# Patient Record
Sex: MALE | Race: WHITE | NOT HISPANIC OR LATINO | Employment: UNEMPLOYED | URBAN - METROPOLITAN AREA
[De-identification: names, ages, dates, MRNs, and addresses within clinical notes are randomized per-mention and may not be internally consistent; named-entity substitution may affect disease eponyms.]

---

## 2017-06-21 ENCOUNTER — HOSPITAL ENCOUNTER (EMERGENCY)
Facility: HOSPITAL | Age: 57
Discharge: HOME | End: 2017-06-21
Attending: EMERGENCY MEDICINE | Admitting: EMERGENCY MEDICINE

## 2017-06-21 VITALS
WEIGHT: 315 LBS | HEIGHT: 72 IN | HEART RATE: 85 BPM | BODY MASS INDEX: 42.66 KG/M2 | TEMPERATURE: 98.3 F | SYSTOLIC BLOOD PRESSURE: 114 MMHG | DIASTOLIC BLOOD PRESSURE: 77 MMHG | RESPIRATION RATE: 20 BRPM | OXYGEN SATURATION: 96 %

## 2017-06-21 DIAGNOSIS — M54.42 ACUTE BILATERAL LOW BACK PAIN WITH LEFT-SIDED SCIATICA: Primary | ICD-10-CM

## 2017-06-21 PROCEDURE — 25010000002 KETOROLAC TROMETHAMINE PER 15 MG: Performed by: EMERGENCY MEDICINE

## 2017-06-21 PROCEDURE — 25010000002 METHYLPREDNISOLONE PER 125 MG: Performed by: EMERGENCY MEDICINE

## 2017-06-21 PROCEDURE — 96372 THER/PROPH/DIAG INJ SC/IM: CPT

## 2017-06-21 PROCEDURE — 99282 EMERGENCY DEPT VISIT SF MDM: CPT | Performed by: EMERGENCY MEDICINE

## 2017-06-21 PROCEDURE — 99283 EMERGENCY DEPT VISIT LOW MDM: CPT

## 2017-06-21 RX ORDER — NABUMETONE 750 MG/1
750 TABLET, FILM COATED ORAL 2 TIMES DAILY PRN
Qty: 14 TABLET | Refills: 0 | Status: SHIPPED | OUTPATIENT
Start: 2017-06-21 | End: 2017-06-28

## 2017-06-21 RX ORDER — METHYLPREDNISOLONE SODIUM SUCCINATE 125 MG/2ML
80 INJECTION, POWDER, LYOPHILIZED, FOR SOLUTION INTRAMUSCULAR; INTRAVENOUS ONCE
Status: COMPLETED | OUTPATIENT
Start: 2017-06-21 | End: 2017-06-21

## 2017-06-21 RX ORDER — KETOROLAC TROMETHAMINE 30 MG/ML
INJECTION, SOLUTION INTRAMUSCULAR; INTRAVENOUS
Status: DISCONTINUED
Start: 2017-06-21 | End: 2017-06-21 | Stop reason: HOSPADM

## 2017-06-21 RX ORDER — HYDROCODONE BITARTRATE AND ACETAMINOPHEN 7.5; 325 MG/1; MG/1
1 TABLET ORAL EVERY 6 HOURS PRN
Qty: 20 TABLET | Refills: 0 | Status: SHIPPED | OUTPATIENT
Start: 2017-06-21 | End: 2017-07-20

## 2017-06-21 RX ORDER — KETOROLAC TROMETHAMINE 30 MG/ML
60 INJECTION, SOLUTION INTRAMUSCULAR; INTRAVENOUS ONCE
Status: DISCONTINUED | OUTPATIENT
Start: 2017-06-21 | End: 2017-06-21 | Stop reason: HOSPADM

## 2017-06-21 RX ORDER — PREDNISONE 10 MG/1
TABLET ORAL
Qty: 21 TABLET | Refills: 0 | Status: SHIPPED | OUTPATIENT
Start: 2017-06-21 | End: 2017-07-20

## 2017-06-21 RX ORDER — KETOROLAC TROMETHAMINE 15 MG/ML
60 INJECTION, SOLUTION INTRAMUSCULAR; INTRAVENOUS ONCE
Status: DISCONTINUED | OUTPATIENT
Start: 2017-06-21 | End: 2017-06-21 | Stop reason: CLARIF

## 2017-06-21 RX ORDER — CETIRIZINE HYDROCHLORIDE 10 MG/1
10 TABLET ORAL DAILY
COMMUNITY
End: 2017-07-20

## 2017-06-21 RX ORDER — IBUPROFEN 800 MG/1
800 TABLET ORAL EVERY 8 HOURS PRN
COMMUNITY
End: 2017-07-20

## 2017-06-21 RX ORDER — METHOCARBAMOL 750 MG/1
750 TABLET, FILM COATED ORAL 3 TIMES DAILY PRN
Qty: 42 TABLET | Refills: 0 | Status: SHIPPED | OUTPATIENT
Start: 2017-06-21 | End: 2017-06-28

## 2017-06-21 RX ADMIN — METHYLPREDNISOLONE SODIUM SUCCINATE 80 MG: 125 INJECTION, POWDER, FOR SOLUTION INTRAMUSCULAR; INTRAVENOUS at 09:39

## 2017-06-21 RX ADMIN — KETOROLAC TROMETHAMINE 60 MG: 30 INJECTION INTRAMUSCULAR; INTRAVENOUS at 09:37

## 2017-06-21 NOTE — DISCHARGE INSTRUCTIONS
Medications as directed.  Apply heat to low back pain relief and comfort.  Gentle stretching.  Follow-up with Dr. Denis as above.  Recommend daily exercise and healthy diet to improve overall health.  Return to ED for medical emergencies.    Hypertension  Hypertension, commonly called high blood pressure, is when the force of blood pumping through your arteries is too strong. Your arteries are the blood vessels that carry blood from your heart throughout your body. A blood pressure reading consists of a higher number over a lower number, such as 110/72. The higher number (systolic) is the pressure inside your arteries when your heart pumps. The lower number (diastolic) is the pressure inside your arteries when your heart relaxes. Ideally you want your blood pressure below 120/80.  Hypertension forces your heart to work harder to pump blood. Your arteries may become narrow or stiff. Having untreated or uncontrolled hypertension can cause heart attack, stroke, kidney disease, and other problems.  RISK FACTORS  Some risk factors for high blood pressure are controllable. Others are not.   Risk factors you cannot control include:   · Race. You may be at higher risk if you are .  · Age. Risk increases with age.  · Gender. Men are at higher risk than women before age 45 years. After age 65, women are at higher risk than men.  Risk factors you can control include:  · Not getting enough exercise or physical activity.  · Being overweight.  · Getting too much fat, sugar, calories, or salt in your diet.  · Drinking too much alcohol.  SIGNS AND SYMPTOMS  Hypertension does not usually cause signs or symptoms. Extremely high blood pressure (hypertensive crisis) may cause headache, anxiety, shortness of breath, and nosebleed.  DIAGNOSIS  To check if you have hypertension, your health care provider will measure your blood pressure while you are seated, with your arm held at the level of your heart. It should be  measured at least twice using the same arm. Certain conditions can cause a difference in blood pressure between your right and left arms. A blood pressure reading that is higher than normal on one occasion does not mean that you need treatment. If it is not clear whether you have high blood pressure, you may be asked to return on a different day to have your blood pressure checked again. Or, you may be asked to monitor your blood pressure at home for 1 or more weeks.  TREATMENT  Treating high blood pressure includes making lifestyle changes and possibly taking medicine. Living a healthy lifestyle can help lower high blood pressure. You may need to change some of your habits.  Lifestyle changes may include:  · Following the DASH diet. This diet is high in fruits, vegetables, and whole grains. It is low in salt, red meat, and added sugars.  · Keep your sodium intake below 2,300 mg per day.  · Getting at least 30-45 minutes of aerobic exercise at least 4 times per week.  · Losing weight if necessary.  · Not smoking.  · Limiting alcoholic beverages.  · Learning ways to reduce stress.  Your health care provider may prescribe medicine if lifestyle changes are not enough to get your blood pressure under control, and if one of the following is true:  · You are 18-59 years of age and your systolic blood pressure is above 140.  · You are 60 years of age or older, and your systolic blood pressure is above 150.  · Your diastolic blood pressure is above 90.  · You have diabetes, and your systolic blood pressure is over 140 or your diastolic blood pressure is over 90.  · You have kidney disease and your blood pressure is above 140/90.  · You have heart disease and your blood pressure is above 140/90.  Your personal target blood pressure may vary depending on your medical conditions, your age, and other factors.  HOME CARE INSTRUCTIONS  · Have your blood pressure rechecked as directed by your health care provider.    · Take  medicines only as directed by your health care provider. Follow the directions carefully. Blood pressure medicines must be taken as prescribed. The medicine does not work as well when you skip doses. Skipping doses also puts you at risk for problems.  · Do not smoke.    · Monitor your blood pressure at home as directed by your health care provider.   SEEK MEDICAL CARE IF:   · You think you are having a reaction to medicines taken.  · You have recurrent headaches or feel dizzy.  · You have swelling in your ankles.  · You have trouble with your vision.  SEEK IMMEDIATE MEDICAL CARE IF:  · You develop a severe headache or confusion.  · You have unusual weakness, numbness, or feel faint.  · You have severe chest or abdominal pain.  · You vomit repeatedly.  · You have trouble breathing.  MAKE SURE YOU:   · Understand these instructions.  · Will watch your condition.  · Will get help right away if you are not doing well or get worse.     This information is not intended to replace advice given to you by your health care provider. Make sure you discuss any questions you have with your health care provider.     Document Released: 12/18/2006 Document Revised: 05/03/2016 Document Reviewed: 10/10/2014  [employer] Patient Education ©2017 [employer]

## 2017-06-21 NOTE — ED PROVIDER NOTES
Subjective   Patient is a 56 y.o. male presenting with back pain.   History provided by:  Patient  Back Pain   Location:  Lumbar spine  Quality:  Burning  Radiates to:  L posterior upper leg  Pain severity:  Severe  Onset quality:  Gradual  Duration:  5 days (including today)  Timing:  Constant  Progression:  Worsening  Chronicity:  New  Context comment:  Moving furnace as described below.  Relieved by:  Nothing  Worsened by:  Bending, twisting and standing  Ineffective treatments:  Ibuprofen  Associated symptoms: numbness (posterior aspect left lower extremity) and tingling ( posterior aspectleft lower extremity)    Associated symptoms: no abdominal pain, no abdominal swelling, no bladder incontinence, no bowel incontinence, no chest pain, no dysuria, no fever, no headaches, no perianal numbness and no weakness    Risk factors: obesity      HPI Narrative: is a 55 yo WM who present secondary to low back pain that radiates to the posterior aspect of the left lower extremity.  Patient reports 4 days ago he was moving a furnace into his home.  There is no specific injury but afterwords he began having pain in his low back pain is bilateral.  The pain radiates to the posterior aspect of the left lower extremity.  He reports numbness and burning pain.  The pain is worsened with ambulation.  Patient has been taking ibuprofen at home without relief.  He went to work today but his job requires him to ambulate a significant amount of the day.  He was unable do this.  He presents for evaluation.        Review of Systems   Constitutional: Negative for chills, diaphoresis and fever.   HENT: Negative for congestion, ear pain and sore throat.    Eyes: Negative for pain and discharge.   Respiratory: Negative for chest tightness, shortness of breath, wheezing and stridor.    Cardiovascular: Negative for chest pain, palpitations and leg swelling.   Gastrointestinal: Negative for abdominal pain, bowel incontinence, diarrhea,  "nausea and vomiting.   Genitourinary: Negative for bladder incontinence, dysuria, flank pain, frequency and hematuria.   Musculoskeletal: Positive for back pain. Negative for myalgias, neck pain and neck stiffness.   Skin: Negative for color change, pallor and rash.   Neurological: Positive for tingling ( posterior aspectleft lower extremity) and numbness (posterior aspect left lower extremity). Negative for dizziness, seizures, syncope, weakness and headaches.   Psychiatric/Behavioral: Negative for agitation and confusion. The patient is not nervous/anxious.    All other systems reviewed and are negative.      Past Medical History:   Diagnosis Date   • Arthritis    • Hernia, inguinal    • Hypertension        No Known Allergies    Past Surgical History:   Procedure Laterality Date   • EYE SURGERY      as a child for \"cross sided\"   • HERNIA REPAIR         History reviewed. No pertinent family history.    Social History     Social History   • Marital status:      Spouse name: N/A   • Number of children: N/A   • Years of education: N/A     Social History Main Topics   • Smoking status: Never Smoker   • Smokeless tobacco: None   • Alcohol use No   • Drug use: No   • Sexual activity: Not Asked     Other Topics Concern   • None     Social History Narrative   • None           Objective   Physical Exam   Constitutional: He is oriented to person, place, and time. He appears well-developed and well-nourished. No distress.   Morbid obesity 56-year-old white male laying in bed.  He appears in fair overall health.  He complains of low back pain that radiates down his left leg.  He presents for evaluation   HENT:   Head: Normocephalic and atraumatic.   Right Ear: External ear normal.   Left Ear: External ear normal.   Nose: Nose normal.   Mouth/Throat: Oropharynx is clear and moist.   Eyes: Conjunctivae and EOM are normal.   Neck: Normal range of motion. Neck supple. No tracheal deviation present.   Cardiovascular: " Normal rate, regular rhythm, normal heart sounds and intact distal pulses.  Exam reveals no gallop and no friction rub.    No murmur heard.  Pulmonary/Chest: Effort normal and breath sounds normal. No stridor. No respiratory distress. He has no wheezes. He has no rales.   Abdominal: Soft. He exhibits no distension. There is no tenderness.   Musculoskeletal: He exhibits no edema.        Lumbar back: He exhibits decreased range of motion, tenderness (across all aspects of low backboth bony and muscular.), bony tenderness and pain. He exhibits no swelling, no edema and no deformity.   Neurological: He is alert and oriented to person, place, and time. He has normal reflexes. No cranial nerve deficit.   Skin: Skin is warm and dry. No rash noted. He is not diaphoretic. No erythema.   Psychiatric: He has a normal mood and affect. His behavior is normal.   Nursing note and vitals reviewed.      Procedures         ED Course  ED Course   Comment By Time   06/21/17  9:39 AM  Patient's symptoms consistent with Lumbar strain with sciatica.  Patient has experience episodic low back pain but is never severe sciatica.  Will treat with NSAIDs, muscle eyes, steroids and narcotic pain medicine.  Recommend follow-up with PMD. Dash Miller MD 06/21 9409        My differential diagnosis for back pain includes but is not limited to:  Musculoskeletal strain, contusion, retroperitoneal hematoma, disc protrusion, vertebral fracture, transverse process fracture, rib fracture, facet syndrome, sacroiliac joint strain, sciatica, renal injury, splenic injury, pancreatic injury, osteoarthritis, lumbar spondylosis, spinal stenosis, ankylosing spondylitis, sacroiliac joint inflammation, pancreatitis, perforated peptic ulcer, diverticulitis, endometriosis, chronic PID, epidural abscess, osteomyelitis, retroperitoneal abscess, pyelonephritis, pneumonia, subphrenic abscess, tuberculosis, neurofibroma, meningioma, multiple myeloma, lymphoma,  metastatic cancer, primary cancer, AAA, aortic dissection, spinal ischemia, referred pain, ureterolithiasis            MDM  Number of Diagnoses or Management Options  Acute bilateral low back pain with left-sided sciatica: new and does not require workup  Risk of Complications, Morbidity, and/or Mortality  Presenting problems: low  Diagnostic procedures: minimal  Management options: low        Final diagnoses:   Acute bilateral low back pain with left-sided sciatica            Dash Miller MD  06/21/17 9155

## 2017-06-21 NOTE — ED NOTES
ELDA completed.   [facility] # [id number]     Lyssa Gimenez  06/21/17 0947

## 2017-07-20 ENCOUNTER — OFFICE VISIT (OUTPATIENT)
Dept: FAMILY MEDICINE CLINIC | Facility: CLINIC | Age: 57
End: 2017-07-20

## 2017-07-20 VITALS
OXYGEN SATURATION: 96 % | HEART RATE: 116 BPM | SYSTOLIC BLOOD PRESSURE: 122 MMHG | BODY MASS INDEX: 42.66 KG/M2 | HEIGHT: 72 IN | WEIGHT: 315 LBS | DIASTOLIC BLOOD PRESSURE: 80 MMHG | TEMPERATURE: 98.5 F

## 2017-07-20 DIAGNOSIS — Z51.81 MEDICATION MONITORING ENCOUNTER: ICD-10-CM

## 2017-07-20 DIAGNOSIS — M54.42 ACUTE MIDLINE LOW BACK PAIN WITH LEFT-SIDED SCIATICA: Primary | ICD-10-CM

## 2017-07-20 PROCEDURE — 72100 X-RAY EXAM L-S SPINE 2/3 VWS: CPT | Performed by: INTERNAL MEDICINE

## 2017-07-20 PROCEDURE — 99214 OFFICE O/P EST MOD 30 MIN: CPT | Performed by: INTERNAL MEDICINE

## 2017-07-20 RX ORDER — PREDNISONE 10 MG/1
TABLET ORAL DAILY
Qty: 21 TABLET | Refills: 0 | Status: SHIPPED | OUTPATIENT
Start: 2017-07-20 | End: 2017-08-18

## 2017-07-20 RX ORDER — IBUPROFEN 200 MG
200 TABLET ORAL EVERY 6 HOURS PRN
COMMUNITY
End: 2017-10-19 | Stop reason: ALTCHOICE

## 2017-07-20 RX ORDER — TIZANIDINE HYDROCHLORIDE 2 MG/1
2 CAPSULE, GELATIN COATED ORAL 2 TIMES DAILY PRN
Qty: 60 CAPSULE | Refills: 0 | Status: SHIPPED | OUTPATIENT
Start: 2017-07-20 | End: 2017-09-28

## 2017-07-20 NOTE — PROGRESS NOTES
Subjective   Michael Lopez is a 56 y.o. male.   Back pain began after moving furnace from truck   History of Present Illness   Back pain flared  6/20 after moving  Saw er md  And followed by md in [place] by patient's history does have a work note permitting him to be away from work but they have not wanted to address disability form for work.  scatica l leg down to foot  physical therapy and [place] 3 times weekly history of occasional back pain but nothing of this dramatic nature.  Sciatica down left leg denies any trouble with urinating or bowel movement  Review of Systems   Musculoskeletal: Positive for back pain.   All other systems reviewed and are negative.      Objective   Vitals:    07/20/17 1444   BP: 122/80   Pulse: 116   Temp: 98.5 °F (36.9 °C)   SpO2: 96%   Weight: (!) 324 lb (147 kg)     Physical Exam   Constitutional: He appears well-developed and well-nourished.   HENT:   Head: Normocephalic and atraumatic.   Eyes: Pupils are equal, round, and reactive to light.   Cardiovascular: Normal rate, regular rhythm and normal heart sounds.    Pulmonary/Chest: Effort normal.   Abdominal: Soft. Bowel sounds are normal.   Musculoskeletal:   Gets 30° forward, 15° backwards, 30° left and right lateral flexion.  Mild discomfort palpation lower lumbar spine midline slightly to the left and right of midline   Neurological: He is alert.   Knee jerks 2+ bilaterally.  Positive straight-leg raise on the left at 35° negative on the right   Nursing note and vitals reviewed.      No results found for: INR    Procedures  LS-spine obtained.  2 views no comparison available.  Significant narrowing seen on lateral view at L5-S1 and L4-L5 another prescription bony or soft tissue abnormalities are noted.,  With the exception of minimal spurring.  Assessment/Plan lumbar pain status post injury with back.  Plan MRI Zanaflex 2 mg twice a day Tylenol 3 #30 with drug contract prednisone 60-10 we will complete form for work.   Tentative follow-up will be one month's time.    Urine drug screen placed, awaiting  results of that also.  [place] urine drug screen and drug contract    Form for disability for work completed for patient is a very physical job is present state he cannot function at capacity patient will follow-up one month's time and await pending tests this woman is medications    Much of this encounter note is an electronic transcription/translation of spoken language to printed text.  The electronic translation of spoken language may permit erroneous, or at times, nonsensical words or phrases to be inadvertently transcribed.  Although I have reviewed the note for such errors, some may still exist.

## 2017-07-27 ENCOUNTER — DOCUMENTATION (OUTPATIENT)
Dept: FAMILY MEDICINE CLINIC | Facility: CLINIC | Age: 57
End: 2017-07-27

## 2017-07-27 ENCOUNTER — TELEPHONE (OUTPATIENT)
Dept: FAMILY MEDICINE CLINIC | Facility: CLINIC | Age: 57
End: 2017-07-27

## 2017-07-27 NOTE — TELEPHONE ENCOUNTER
PT SAID HIS TWO WEEK WORK NOTE IS . CAN YOU PLEASE FAX ANOTHER ONE TO THE PERSONAL DEPARTMENT [phone] FOR ONE MONTH

## 2017-07-29 LAB — CONV REPORT SUMMARY: NORMAL

## 2017-08-01 ENCOUNTER — TELEPHONE (OUTPATIENT)
Dept: FAMILY MEDICINE CLINIC | Facility: CLINIC | Age: 57
End: 2017-08-01

## 2017-08-01 ENCOUNTER — HOSPITAL ENCOUNTER (OUTPATIENT)
Dept: MRI IMAGING | Facility: HOSPITAL | Age: 57
Discharge: HOME | End: 2017-08-01
Admitting: INTERNAL MEDICINE

## 2017-08-01 DIAGNOSIS — M54.42 ACUTE MIDLINE LOW BACK PAIN WITH LEFT-SIDED SCIATICA: ICD-10-CM

## 2017-08-01 PROCEDURE — 72148 MRI LUMBAR SPINE W/O DYE: CPT

## 2017-08-03 DIAGNOSIS — M51.26 PROTRUSION OF LUMBAR INTERVERTEBRAL DISC: Primary | ICD-10-CM

## 2017-08-04 ENCOUNTER — TELEPHONE (OUTPATIENT)
Dept: FAMILY MEDICINE CLINIC | Facility: CLINIC | Age: 57
End: 2017-08-04

## 2017-08-18 ENCOUNTER — OFFICE VISIT (OUTPATIENT)
Dept: FAMILY MEDICINE CLINIC | Facility: CLINIC | Age: 57
End: 2017-08-18

## 2017-08-18 VITALS
TEMPERATURE: 97.7 F | WEIGHT: 315 LBS | HEART RATE: 103 BPM | SYSTOLIC BLOOD PRESSURE: 120 MMHG | OXYGEN SATURATION: 98 % | DIASTOLIC BLOOD PRESSURE: 78 MMHG | BODY MASS INDEX: 42.66 KG/M2 | HEIGHT: 72 IN

## 2017-08-18 DIAGNOSIS — M51.26 HNP (HERNIATED NUCLEUS PULPOSUS), LUMBAR: ICD-10-CM

## 2017-08-18 DIAGNOSIS — M54.40 ACUTE LOW BACK PAIN WITH SCIATICA, SCIATICA LATERALITY UNSPECIFIED, UNSPECIFIED BACK PAIN LATERALITY: Primary | ICD-10-CM

## 2017-08-18 PROCEDURE — 99213 OFFICE O/P EST LOW 20 MIN: CPT | Performed by: INTERNAL MEDICINE

## 2017-08-22 ENCOUNTER — TELEPHONE (OUTPATIENT)
Dept: FAMILY MEDICINE CLINIC | Facility: CLINIC | Age: 57
End: 2017-08-22

## 2017-08-22 NOTE — TELEPHONE ENCOUNTER
PT NEEDS HIS [employer] FORMS REFAXED BECAUSE HIS INSURANCE COMP SAID THEY HAVE NEVER RECEIVED THEM

## 2017-09-14 ENCOUNTER — OFFICE VISIT (OUTPATIENT)
Dept: FAMILY MEDICINE CLINIC | Facility: CLINIC | Age: 57
End: 2017-09-14

## 2017-09-14 VITALS
WEIGHT: 315 LBS | OXYGEN SATURATION: 95 % | DIASTOLIC BLOOD PRESSURE: 70 MMHG | BODY MASS INDEX: 42.66 KG/M2 | HEART RATE: 104 BPM | TEMPERATURE: 98.6 F | SYSTOLIC BLOOD PRESSURE: 142 MMHG | HEIGHT: 72 IN

## 2017-09-14 DIAGNOSIS — M54.42 LOW BACK PAIN WITH LEFT-SIDED SCIATICA, UNSPECIFIED BACK PAIN LATERALITY, UNSPECIFIED CHRONICITY: Primary | ICD-10-CM

## 2017-09-14 PROCEDURE — 99213 OFFICE O/P EST LOW 20 MIN: CPT | Performed by: INTERNAL MEDICINE

## 2017-09-14 RX ORDER — LEVOCETIRIZINE DIHYDROCHLORIDE 5 MG/1
5 TABLET, FILM COATED ORAL EVERY EVENING
COMMUNITY
End: 2017-10-19 | Stop reason: ALTCHOICE

## 2017-09-14 NOTE — PROGRESS NOTES
Subjective   Michael Lopez is a 57 y.o. male.   Lumbar pain improved somewhat, sees neurosurg  9/30/17  History of Present Illness   Back improved w use of inversion table  Old back issue for 30yr off and on has specific injury at work which cleared his back.  Sciatica portion of her left leg is now just buttock area clearly better doing some stretching exercises.  Is not doing gentle walking campaign we will have him start that series neurosurgeon soon    Review of Systems   Constitutional: Negative.    HENT: Negative.    Eyes: Negative.    Respiratory: Negative.    Cardiovascular: Negative.    Gastrointestinal: Negative.    Endocrine: Negative.    Genitourinary: Negative.    Musculoskeletal: Positive for back pain.   Skin: Negative.    Allergic/Immunologic: Negative.    Neurological: Negative.    Hematological: Negative.    Psychiatric/Behavioral: Negative.        Objective   Physical Exam   Constitutional: He appears well-developed and well-nourished.   HENT:   Head: Normocephalic and atraumatic.   Cardiovascular: Normal rate, regular rhythm and normal heart sounds.    Pulmonary/Chest: Effort normal and breath sounds normal.   Musculoskeletal:   Flexion of back improved from last getting 75° forward, 20° backwards, 25-30° left and right lateral flexion.  Minimal discomfort palpation lower back midline.  By history describes left sciatica pain in his at the left buttock area currently.   Neurological: He is alert.   Negative straight leg raises, knee jerks 2+ bilaterally   Nursing note and vitals reviewed.      Assessment/Plan lumbar pain plan gentle walking campaign continue stretching exercises we work note through the end of October on a monthly type schedule.  Does see neurosurgery on 9/30/17 evaluate for surgical versus other treatment.  Tentatively follow-up in one month's time  Does have a form here we need to complete also    Much of this encounter note is an electronic transcription/translation of spoken  language to printed text.  The electronic translation of spoken language may permit erroneous, or at times, nonsensical words or phrases to be inadvertently transcribed.  Although I have reviewed the note for such errors, some may still exist.    Work note one month until the end of October, sees neurosurgery on 9/30/17

## 2017-09-15 PROBLEM — I10 HYPERTENSION: Status: ACTIVE | Noted: 2017-09-15

## 2017-09-19 ENCOUNTER — TELEPHONE (OUTPATIENT)
Dept: FAMILY MEDICINE CLINIC | Facility: CLINIC | Age: 57
End: 2017-09-19

## 2017-09-21 DIAGNOSIS — R06.83 SNORING: Primary | ICD-10-CM

## 2017-09-28 ENCOUNTER — OFFICE VISIT (OUTPATIENT)
Dept: NEUROSURGERY | Facility: CLINIC | Age: 57
End: 2017-09-28

## 2017-09-28 VITALS
HEIGHT: 72 IN | DIASTOLIC BLOOD PRESSURE: 78 MMHG | WEIGHT: 315 LBS | HEART RATE: 99 BPM | BODY MASS INDEX: 42.66 KG/M2 | SYSTOLIC BLOOD PRESSURE: 128 MMHG

## 2017-09-28 DIAGNOSIS — M51.16 NEURITIS OR RADICULITIS DUE TO RUPTURE OF LUMBAR INTERVERTEBRAL DISC: Primary | ICD-10-CM

## 2017-09-28 PROCEDURE — 99243 OFF/OP CNSLTJ NEW/EST LOW 30: CPT | Performed by: NEUROLOGICAL SURGERY

## 2017-09-28 RX ORDER — CETIRIZINE HYDROCHLORIDE 10 MG/1
10 TABLET ORAL DAILY
COMMUNITY
End: 2019-06-20

## 2017-09-28 NOTE — PROGRESS NOTES
Subjective   Patient ID: Michael Lopez is a 57 y.o. male is being seen for consultation today at the request of Baldev Carlson Jr., MD for back pain that radiates into left leg with numbness and tingling.     History of Present Illness    This patient was having severe pain in his back and into his left leg.  This began in mid June when he had been doing a lot of heavy work.  Subsequent to that the pain was so severe that he couldn't get out of bed.  He then began doing some physical therapy and got an inversion table.  Those treatments have helped a lot and he is feeling much better now.  He still has some pain but it is not nearly as severe as it was.  It is still sharp and stabbing in nature.  He has no trouble with the right leg and no difficulty with bowel or bladder control or other associated symptoms.  Activity seems to make the pain a little worse.    The following portions of the patient's history were reviewed and updated as appropriate: allergies, current medications, past family history, past medical history, past social history, past surgical history and problem list.    Review of Systems   Constitutional: Positive for activity change.   Respiratory: Negative for chest tightness and shortness of breath.    Cardiovascular: Negative for chest pain.   Musculoskeletal: Positive for back pain and myalgias.        Left leg pain   Neurological: Positive for weakness and numbness.        Tingling    All other systems reviewed and are negative.      Objective   Physical Exam   Constitutional: He is oriented to person, place, and time. He appears well-developed and well-nourished.   HENT:   Head: Normocephalic and atraumatic.   Eyes: Conjunctivae and EOM are normal. Pupils are equal, round, and reactive to light.   Fundoscopic exam:       The right eye shows no papilledema. The right eye shows venous pulsations.        The left eye shows no papilledema. The left eye shows venous pulsations.   Neck: Carotid bruit  is not present.   Neurological: He is oriented to person, place, and time. He has a normal Finger-Nose-Finger Test and a normal Heel to Shin Test. Gait normal.   Reflex Scores:       Tricep reflexes are 2+ on the right side and 2+ on the left side.       Bicep reflexes are 2+ on the right side and 2+ on the left side.       Brachioradialis reflexes are 2+ on the right side and 2+ on the left side.       Patellar reflexes are 2+ on the right side and 2+ on the left side.       Achilles reflexes are 2+ on the right side and 2+ on the left side.  Psychiatric: His speech is normal.     Neurologic Exam     Mental Status   Oriented to person, place, and time.   Registration of memory: Good recent and remote memory.   Attention: normal. Concentration: normal.   Speech: speech is normal   Level of consciousness: alert  Knowledge: consistent with education.     Cranial Nerves     CN II   Visual fields full to confrontation.   Visual acuity: normal    CN III, IV, VI   Pupils are equal, round, and reactive to light.  Extraocular motions are normal.     CN V   Facial sensation intact.   Right corneal reflex: normal  Left corneal reflex: normal    CN VII   Facial expression full, symmetric.   Right facial weakness: none  Left facial weakness: none    CN VIII   Hearing: intact    CN IX, X   Palate: symmetric    CN XI   Right sternocleidomastoid strength: normal  Left sternocleidomastoid strength: normal    CN XII   Tongue: not atrophic  Tongue deviation: none    Motor Exam   Muscle bulk: normal  Right arm tone: normal  Left arm tone: normal  Right leg tone: normal  Left leg tone: normal    Strength   Strength 5/5 except as noted.     Sensory Exam   Light touch normal.     Gait, Coordination, and Reflexes     Gait  Gait: normal    Coordination   Finger to nose coordination: normal  Heel to shin coordination: normal    Reflexes   Right brachioradialis: 2+  Left brachioradialis: 2+  Right biceps: 2+  Left biceps: 2+  Right triceps:  2+  Left triceps: 2+  Right patellar: 2+  Left patellar: 2+  Right achilles: 2+  Left achilles: 2+  Right : 2+  Left : 2+      Assessment/Plan   Independent Review of Radiographic Studies:      I reviewed an MRI of his lumbar spine done on August 1 of this year.  This shows a fairly large disc herniation to the left side at L4 5.  The other levels look okay.    Medical Decision Making:      I told the patient about the imaging.  I told him that the herniated disc is almost certainly the reason for his pain.  I told him that as long as he is improving I would tend to just leave this alone.  He is in complete agreement with that plan.  I told him to call me if the pain comes back we can always get him back in the office.  He agrees and will call if the pain returns.    Michael was seen today for back pain, leg pain, numbness and tingling.    Diagnoses and all orders for this visit:    Neuritis or radiculitis due to rupture of lumbar intervertebral disc    Return for Recheck and call after treatment or consultation.

## 2017-10-17 ENCOUNTER — OFFICE VISIT (OUTPATIENT)
Dept: SLEEP MEDICINE | Facility: HOSPITAL | Age: 57
End: 2017-10-17
Attending: INTERNAL MEDICINE

## 2017-10-17 VITALS
HEART RATE: 83 BPM | WEIGHT: 315 LBS | SYSTOLIC BLOOD PRESSURE: 137 MMHG | HEIGHT: 71 IN | DIASTOLIC BLOOD PRESSURE: 85 MMHG | BODY MASS INDEX: 44.1 KG/M2

## 2017-10-17 DIAGNOSIS — G47.33 OBSTRUCTIVE SLEEP APNEA SYNDROME: ICD-10-CM

## 2017-10-17 DIAGNOSIS — R06.83 SNORING: ICD-10-CM

## 2017-10-17 DIAGNOSIS — I10 ESSENTIAL HYPERTENSION: Primary | ICD-10-CM

## 2017-10-17 PROCEDURE — G0463 HOSPITAL OUTPT CLINIC VISIT: HCPCS

## 2017-10-17 NOTE — PROGRESS NOTES
"PULMONARY SLEEP CONSULT NOTE      PATIENT IDENTIFICATION:  Name: Michael Lopez  Age: 57 y.o.  Sex: male  :  1960  MRN: [id number]    DATE OF CONSULTATION:  10/17/2017   Referring Physician: No admitting provider for patient encounter.                  CHIEF COMPLAINT: Obstructive Sleep Apnea    History of Present Illness:   Michael Lopez is a 57 y.o. male Pt with still multiple wakening up at night with sleepiness fatigue and snoring, witnessed apnea, Hard  to get up in the morning. Daytime fatigue sleepiness loss of energy, Wing score of (11 )       Review of Systems:   Constitutional: As above    Eyes: negative   ENT/oropharynx: negative   Cardiovascular: negative   Respiratory: negative   Gastrointestinal: negative   Genitourinary: negative   Neurological: negative   Musculoskeletal: negative   Integument/breast: negative   Endocrine: negative   Allergic/Immunologic: negative     Past Medical History:  Past Medical History:   Diagnosis Date   • Arthritis    • Back pain    • Hernia, inguinal    • Hypertension        Past Surgical History:  Past Surgical History:   Procedure Laterality Date   • EYE SURGERY      as a child for \"cross sided\"   • HERNIA REPAIR          Family History:  Family History   Problem Relation Age of Onset   • Diverticulitis Mother    • Arthritis Mother    • Heart disease Father         Social History:   Social History   Substance Use Topics   • Smoking status: Never Smoker   • Smokeless tobacco: Not on file   • Alcohol use No        Allergies:  No Known Allergies    Home Meds:    (Not in a hospital admission)    Objective:    Vitals Ranges:   Heart Rate:  [83] 83  BP: (137)/(85) 137/85  Body mass index is 47.14 kg/(m^2).         Exam:    General Appearance:      Head:  Normocephalic, without obvious abnormality, atraumatic.   Eyes:  Conjunctiva/corneas clear, EOM's intact, both eyes.         Ears:  Normal external ear canals, both ears.    Nose:  Nares normal, no drainage    "   Throat:  Lips, mucosa, and tongue normal. Mallampati score 4    Neck:  Supple, symmetrical, trachea midline. No JVD.  Lungs:   Bilateral basal rhonchi bilaterally, respirations unlabored symmetrical wall movement.    Chest wall:  No tenderness or deformity.    Heart:  Regular rate and rhythm, S1 and S2 normal.  Abdomen: Soft, non-tender, no masses, no organomegaly.    Extremities: Trace edema no clubbing or Cyanosis        Data Review:  All labs (24hrs): No results found for this or any previous visit (from the past 24 hour(s)).     Imaging:  [unfilled]    ASSESSMENT:  obstructive sleep apnea   .Hypertension essential    Cor pulmonalle       PLAN:   This is patient with symptoms of obstructive sleep apnea, NPSG study ASAP / split night study, Avoid supine avoid sedative meds in pm, weight loss, Avoid driving. Long discussion with patient about the physiology of NATALIYA, and long term and short term benefit of treating NATALIYA   Treating NATALIYA will improve BP control           Corey Parish MD. D, ABSM.  10/17/2017  10:59 AM

## 2017-10-19 ENCOUNTER — OFFICE VISIT (OUTPATIENT)
Dept: FAMILY MEDICINE CLINIC | Facility: CLINIC | Age: 57
End: 2017-10-19

## 2017-10-19 VITALS
OXYGEN SATURATION: 96 % | SYSTOLIC BLOOD PRESSURE: 132 MMHG | TEMPERATURE: 98.2 F | DIASTOLIC BLOOD PRESSURE: 80 MMHG | BODY MASS INDEX: 44.1 KG/M2 | HEIGHT: 71 IN | HEART RATE: 90 BPM | WEIGHT: 315 LBS

## 2017-10-19 DIAGNOSIS — M54.40 BILATERAL LOW BACK PAIN WITH SCIATICA, SCIATICA LATERALITY UNSPECIFIED, UNSPECIFIED CHRONICITY: Primary | ICD-10-CM

## 2017-10-19 DIAGNOSIS — M51.26 HERNIATION OF INTERVERTEBRAL DISC BETWEEN L4 AND L5: ICD-10-CM

## 2017-10-19 PROCEDURE — 99213 OFFICE O/P EST LOW 20 MIN: CPT | Performed by: INTERNAL MEDICINE

## 2017-10-19 RX ORDER — NAPROXEN SODIUM 220 MG
220 TABLET ORAL 2 TIMES DAILY PRN
COMMUNITY
End: 2017-11-28

## 2017-10-19 NOTE — PROGRESS NOTES
Subjective   Michael Lopez is a 57 y.o. male.   Improved, has seen neurosurg,  Is better with recommendations per his neurosurgeon to continue conservative measures  History of Present Illness   better w walking  Doing home pt exercises  Can sit up to 1 hour and normal left leg cessation feels muscular but I suspect this is a lesser sciatica type complaints.  Flexion is improved patient has a very physical job which is rare 100% are 0% return to work.  Review of Systems   Musculoskeletal: Positive for back pain.   All other systems reviewed and are negative.      Objective   Vitals:    10/19/17 1100   BP: 132/80   Pulse: 90   Temp: 98.2 °F (36.8 °C)   SpO2: 96%   Weight: (!) 341 lb (155 kg)     Physical Exam   Constitutional: He appears well-developed and well-nourished.   HENT:   Head: Atraumatic.   Eyes: Conjunctivae are normal.   Cardiovascular: Normal rate, regular rhythm and normal heart sounds.    Pulmonary/Chest: Effort normal and breath sounds normal.   Musculoskeletal:   Patient gets 50° forward, 25° backwards, 30° left and right lateral flexion.  Clearly is doing better than last visit.  Minimal discomfort palpation lower lumbar spine midline   Neurological: He is alert.   Patient with negative straight leg raise on the right, positive straight-leg raise on left critically mild at 50° knee jerks 1+   Nursing note and vitals reviewed.      No results found for: INR    Procedures    Assessment/Plan   L4-L5 disc herniation impression is improved plan continue home physical therapy follow-up along month's time to have gain plan patient is continue his home exercises and walking with tentative follow-up time will be the first part of December.  This be discussed further on follow-up visit in one month's time    Much of this encounter note is an electronic transcription/translation of spoken language to printed text.  The electronic translation of spoken language may permit erroneous, or at times, nonsensical  words or phrases to be inadvertently transcribed.  Although I have reviewed the note for such errors, some may still exist.

## 2017-11-28 ENCOUNTER — OFFICE VISIT (OUTPATIENT)
Dept: FAMILY MEDICINE CLINIC | Facility: CLINIC | Age: 57
End: 2017-11-28

## 2017-11-28 VITALS
HEART RATE: 101 BPM | WEIGHT: 315 LBS | SYSTOLIC BLOOD PRESSURE: 128 MMHG | BODY MASS INDEX: 44.1 KG/M2 | DIASTOLIC BLOOD PRESSURE: 80 MMHG | TEMPERATURE: 97.7 F | HEIGHT: 71 IN | OXYGEN SATURATION: 98 %

## 2017-11-28 DIAGNOSIS — M54.50 BILATERAL LOW BACK PAIN WITHOUT SCIATICA, UNSPECIFIED CHRONICITY: Primary | ICD-10-CM

## 2017-11-28 PROCEDURE — 99213 OFFICE O/P EST LOW 20 MIN: CPT | Performed by: INTERNAL MEDICINE

## 2017-11-28 NOTE — PROGRESS NOTES
Subjective   Michael Lopez is a 57 y.o. male.   Much improved  with his back.  No further follow-up with neurosurgeon was seen has further trouble.  History of Present Illness   Feels ready to go back to work.  Patient is much improved with better with gaze use inversion table which does help with moderate back flares.  Is doing some home physical therapy exercises as complete a form course this therapy is basically doing well now is anxious to return to work.  Anticipates completing paperwork today and became return to work on Friday.  Which we will okay.    Review of Systems   All other systems reviewed and are negative.      Objective   Vitals:    11/28/17 1018   BP: 128/80   Pulse: 101   Temp: 97.7 °F (36.5 °C)   SpO2: 98%   Weight: (!) 348 lb 3.2 oz (158 kg)     Physical Exam   Constitutional: He appears well-developed and well-nourished.   HENT:   Head: Normocephalic and atraumatic.   Cardiovascular: Normal rate, regular rhythm and normal heart sounds.    Pulmonary/Chest: Effort normal.   Musculoskeletal:   Back is nontender to palpation.  Patient able to flex 85°-90° forward.  30° backwards, 30° left and right lateral flexion.  Without any evidence of  discomfort   Neurological: He is alert.   Unremarkable gait   Nursing note and vitals reviewed.      No results found for: INR    Procedures    Assessment/Plan   Lumbar pain/resolved plan able to return to work.  patient to return to work this Friday anticipating completing forms to facilitate that    Much of this encounter note is an electronic transcription/translation of spoken language to printed text.  The electronic translation of spoken language may permit erroneous, or at times, nonsensical words or phrases to be inadvertently transcribed.  Although I have reviewed the note for such errors, some may still exist.

## 2017-12-10 ENCOUNTER — APPOINTMENT (OUTPATIENT)
Dept: SLEEP MEDICINE | Facility: HOSPITAL | Age: 57
End: 2017-12-10

## 2018-01-15 ENCOUNTER — HOSPITAL ENCOUNTER (OUTPATIENT)
Dept: SLEEP MEDICINE | Facility: HOSPITAL | Age: 58
Discharge: HOME | End: 2018-01-15
Attending: INTERNAL MEDICINE | Admitting: INTERNAL MEDICINE

## 2018-01-15 ENCOUNTER — TRANSCRIBE ORDERS (OUTPATIENT)
Dept: SLEEP MEDICINE | Facility: HOSPITAL | Age: 58
End: 2018-01-15

## 2018-01-15 DIAGNOSIS — G47.33 OSA (OBSTRUCTIVE SLEEP APNEA): ICD-10-CM

## 2018-01-15 DIAGNOSIS — G47.33 OSA (OBSTRUCTIVE SLEEP APNEA): Primary | ICD-10-CM

## 2018-01-15 DIAGNOSIS — G47.33 OBSTRUCTIVE SLEEP APNEA SYNDROME: ICD-10-CM

## 2018-01-15 PROCEDURE — 95811 POLYSOM 6/>YRS CPAP 4/> PARM: CPT

## 2018-01-30 ENCOUNTER — TELEPHONE (OUTPATIENT)
Dept: SLEEP MEDICINE | Facility: HOSPITAL | Age: 58
End: 2018-01-30

## 2018-01-30 NOTE — TELEPHONE ENCOUNTER
Tech LM w/ patient to call back regarding SS results. Tech faxed DME order request over to [facility] and scheduled 6 week F/U appt for 3/13/18.

## 2018-03-13 ENCOUNTER — APPOINTMENT (OUTPATIENT)
Dept: SLEEP MEDICINE | Facility: HOSPITAL | Age: 58
End: 2018-03-13
Attending: INTERNAL MEDICINE

## 2018-03-27 ENCOUNTER — OFFICE VISIT (OUTPATIENT)
Dept: SLEEP MEDICINE | Facility: HOSPITAL | Age: 58
End: 2018-03-27
Attending: INTERNAL MEDICINE

## 2018-03-27 VITALS
DIASTOLIC BLOOD PRESSURE: 82 MMHG | WEIGHT: 315 LBS | BODY MASS INDEX: 44.1 KG/M2 | SYSTOLIC BLOOD PRESSURE: 130 MMHG | HEART RATE: 86 BPM | HEIGHT: 71 IN

## 2018-03-27 DIAGNOSIS — I27.81 COR PULMONALE, CHRONIC (HCC): ICD-10-CM

## 2018-03-27 DIAGNOSIS — I10 ESSENTIAL HYPERTENSION, MALIGNANT: ICD-10-CM

## 2018-03-27 DIAGNOSIS — G47.33 OBSTRUCTIVE SLEEP APNEA, ADULT: Primary | ICD-10-CM

## 2018-03-27 PROCEDURE — G0463 HOSPITAL OUTPT CLINIC VISIT: HCPCS

## 2018-03-27 NOTE — PROGRESS NOTES
"PULMONARY SLEEP CONSULT NOTE      PATIENT IDENTIFICATION:  Name: Michael Lopez  Age: 57 y.o.  Sex: male  :  1960  MRN: [id number]    DATE OF CONSULTATION:  3/27/2018   Referring Physician: No admitting provider for patient encounter.                  CHIEF COMPLAINT: Obstructive Sleep Apnea    History of Present Illness:   Michael Lopez is a 57 y.o. male  Pt on CPAP feeling better more energy especially the night he use it more than 4 hours, no sleepiness no fatigue no tiredness, no mask irritation no dryness, compliance report reviewed with pt AHI< 5 with good usage.   Still leg swelling     Review of Systems:   Constitutional: negative   Eyes: negative   ENT/oropharynx: negative   Cardiovascular: negative   Respiratory: negative   Gastrointestinal: negative   Genitourinary: negative   Neurological: negative   Musculoskeletal: negative   Integument/breast: negative   Endocrine: negative   Allergic/Immunologic: negative     Past Medical History:  Past Medical History:   Diagnosis Date   • Arthritis    • Back pain    • Hernia, inguinal    • Hypertension        Past Surgical History:  Past Surgical History:   Procedure Laterality Date   • EYE SURGERY      as a child for \"cross sided\"   • HERNIA REPAIR          Family History:  Family History   Problem Relation Age of Onset   • Diverticulitis Mother    • Arthritis Mother    • Heart disease Father         Social History:   Social History   Substance Use Topics   • Smoking status: Never Smoker   • Smokeless tobacco: Not on file   • Alcohol use No        Allergies:  No Known Allergies    Home Meds:    (Not in a hospital admission)    Objective:    Vitals Ranges:   Heart Rate:  [86] 86  BP: (130)/(82) 130/82  Body mass index is 47.14 kg/m².         Exam:    General Appearance:    WDWN  Head:  Normocephalic, without obvious abnormality, atraumatic.   Eyes:  Conjunctiva/corneas clear, EOM's intact, both eyes.         Ears:  Normal external ear canals, both ears.  "   Nose:  Nares normal, no drainage      Throat:  Lips, mucosa, and tongue normal. Mallampati score 3    Neck:  Supple, symmetrical, trachea midline. No JVD.  Lungs:   Bilateral basal rhonchi bilaterally, respirations unlabored symmetrical wall movement.    Chest wall:  No tenderness or deformity.    Heart:  Regular rate and rhythm, S1 and S2 normal.  Abdomen: Soft, non-tender, no masses, no organomegaly.    Extremities: +1 edema no clubbing or Cyanosis        Data Review:  All labs (24hrs): No results found for this or any previous visit (from the past 24 hour(s)).     Imaging:  [unfilled]    ASSESSMENT:  Diagnoses and all orders for this visit:    Obstructive sleep apnea, adult    Cor pulmonale, chronic    Essential hypertension, malignant        PLAN:  This patient with obstructive sleep apnea, compliance is improved. Encourage to use it more frequent, I re-emphasized on pt the long and short term benefit of treating NATALIYA.   Treating NATALIYA will improve BP control   Add lasix 20 mg daily      Corey Parish MD. D, ABSM.  3/27/2018  11:08 AM

## 2018-06-28 RX ORDER — AMLODIPINE BESYLATE 5 MG/1
5 TABLET ORAL DAILY
Qty: 30 TABLET | Refills: 0 | Status: SHIPPED | OUTPATIENT
Start: 2018-06-28 | End: 2019-06-21 | Stop reason: SDUPTHER

## 2018-09-09 NOTE — PROGRESS NOTES
Problem: Impaired Activities of Daily Living  Goal: Achieve highest/safest level of independence in self care  Interventions:  - Assess ability and encourage patient to participate in ADLs to maximize function  - Promote sitting position while performing A Subjective   Michael Lopez is a 56 y.o. male.   Lumbar pain  History of Present Illness   Patient is somewhat better can walk better doing .  Using his inversion table to do  Inversion 5x per day which gives him 1.5 hour of relief   sciatica wants down as to his foot now into l leg pain left buttock and very proximal posterior thigh .  Patient cannot better upon awakening now where she could not walk at all his back was bad.  Did do brief spell of physical therapy is doing home exercises now.  Has a physical job which she cannot do at this point in time prescription needs be 100% or not work.  Is scheduled see neurosurgeon as scheduled for sometime in late September.  Advice that he call them is having a lot of wait list to get his appointment moved up sooner.  Very significant 13 mm disc at L4-L5 on MRI    Review of Systems   All other systems reviewed and are negative.      Objective   Vitals:    08/18/17 1455   BP: 120/78   Pulse: 103   Temp: 97.7 °F (36.5 °C)   SpO2: 98%   Weight: (!) 329 lb 9.6 oz (150 kg)     Physical Exam   Constitutional: He appears well-developed and well-nourished.   HENT:   Head: Atraumatic.   Cardiovascular: Normal rate, regular rhythm and normal heart sounds.    Pulmonary/Chest: Effort normal and breath sounds normal.   Musculoskeletal:   Pain with palpation lower lumbar spine both midline and less so to the left midline gets 30° forward flexion 15° backward flexion, 20° left and right lateral flexion.   Neurological: He is alert.   Positive straight-leg raise on the right great since his pain on the left leg and back positive straight-leg raise on the left at 45°.  Knee jerks are 2+ bilaterally   Nursing note and vitals reviewed.      No results found for: INR    Procedures    Assessment/Plan     Lumbar pain/HNP L4-L5 plan no work until evaluated by neurosurgeon which happens in late September continue present home modalities which included inversion table    Much of this encounter note  is an electronic transcription/translation of spoken language to printed text.  The electronic translation of spoken language may permit erroneous, or at times, nonsensical words or phrases to be inadvertently transcribed.  Although I have reviewed the note for such errors, some may still exist.

## 2018-12-28 ENCOUNTER — OFFICE VISIT (OUTPATIENT)
Dept: RETAIL CLINIC | Facility: CLINIC | Age: 58
End: 2018-12-28

## 2018-12-28 VITALS
RESPIRATION RATE: 14 BRPM | SYSTOLIC BLOOD PRESSURE: 132 MMHG | HEART RATE: 87 BPM | OXYGEN SATURATION: 98 % | TEMPERATURE: 98.4 F | DIASTOLIC BLOOD PRESSURE: 80 MMHG

## 2018-12-28 DIAGNOSIS — R10.2 SUPRAPUBIC DISCOMFORT: ICD-10-CM

## 2018-12-28 DIAGNOSIS — J01.00 ACUTE NON-RECURRENT MAXILLARY SINUSITIS: Primary | ICD-10-CM

## 2018-12-28 LAB
BILIRUB BLD-MCNC: NEGATIVE MG/DL
CLARITY, POC: CLEAR
COLOR UR: ABNORMAL
GLUCOSE UR STRIP-MCNC: NEGATIVE MG/DL
KETONES UR QL: ABNORMAL
LEUKOCYTE EST, POC: NEGATIVE
NITRITE UR-MCNC: NEGATIVE MG/ML
PH UR: 5.5 [PH] (ref 5–8)
PROT UR STRIP-MCNC: NEGATIVE MG/DL
RBC # UR STRIP: NEGATIVE /UL
SP GR UR: 1.03 (ref 1–1.03)
UROBILINOGEN UR QL: NORMAL

## 2018-12-28 PROCEDURE — 99213 OFFICE O/P EST LOW 20 MIN: CPT | Performed by: NURSE PRACTITIONER

## 2018-12-28 PROCEDURE — 81003 URINALYSIS AUTO W/O SCOPE: CPT | Performed by: NURSE PRACTITIONER

## 2018-12-28 RX ORDER — TESTOSTERONE 4 MG/D
PATCH TRANSDERMAL
COMMUNITY
Start: 2018-11-30 | End: 2019-06-20

## 2018-12-28 RX ORDER — BENZONATATE 100 MG/1
CAPSULE ORAL
Qty: 30 CAPSULE | Refills: 0 | Status: SHIPPED | OUTPATIENT
Start: 2018-12-28 | End: 2019-03-11

## 2018-12-28 RX ORDER — PREDNISONE 20 MG/1
20 TABLET ORAL 2 TIMES DAILY
Qty: 10 TABLET | Refills: 0 | Status: SHIPPED | OUTPATIENT
Start: 2018-12-28 | End: 2019-01-02

## 2018-12-28 RX ORDER — AMOXICILLIN 875 MG/1
875 TABLET, COATED ORAL 2 TIMES DAILY
Qty: 20 TABLET | Refills: 0 | Status: SHIPPED | OUTPATIENT
Start: 2018-12-28 | End: 2019-01-07

## 2018-12-28 NOTE — PROGRESS NOTES
Subjective   Michael Lopez is a 58 y.o. male.     59 yo male presents today with sinus congestion and cough. Reports sinus symptoms x 2 weeks. Cough started this morning. Reports blowing greenish brown secretions from nose. + sinus pressure. Denies any chest pain. No cardiac history. No radiation. Reports soreness in chest from coughing. Also reports he has been lifting heavy boxes. Feels some pressure in lower pelvic area . Denies burning or frequency. Reports he has an appt. in a couple weeks with urologist.       Sinusitis   This is a new problem. The current episode started 1 to 4 weeks ago (2 weeks). The problem is unchanged. Maximum temperature: low grade fever  The pain is mild. Associated symptoms include chills, congestion, coughing, headaches, sinus pressure and a sore throat. Pertinent negatives include no ear pain. Past treatments include nothing. The treatment provided no relief.        The following portions of the patient's history were reviewed and updated as appropriate: allergies, current medications, past family history, past medical history, past social history, past surgical history and problem list.    Review of Systems   Constitutional: Positive for chills and fever.        Low grade fever and mild chills    HENT: Positive for congestion, sinus pressure and sore throat. Negative for ear pain and mouth sores.    Eyes: Negative.    Respiratory: Positive for cough. Negative for chest tightness and wheezing.    Cardiovascular: Negative.  Negative for chest pain.   Gastrointestinal: Negative.  Negative for abdominal pain, nausea and vomiting.   Endocrine: Negative.    Genitourinary: Negative for urinary incontinence, discharge, dysuria, flank pain, hematuria, testicular pain and urgency.   Musculoskeletal: Negative.    Skin: Negative.    Allergic/Immunologic: Negative.    Hematological: Negative.    Psychiatric/Behavioral: Negative.        Objective   Physical Exam   Constitutional: He is oriented to  person, place, and time. Vital signs are normal. He appears well-developed and well-nourished.   HENT:   Head: Normocephalic and atraumatic.   Right Ear: Hearing, tympanic membrane, external ear and ear canal normal.   Left Ear: Hearing, tympanic membrane, external ear and ear canal normal.   Nose: Right sinus exhibits maxillary sinus tenderness. Right sinus exhibits no frontal sinus tenderness. Left sinus exhibits maxillary sinus tenderness. Left sinus exhibits no frontal sinus tenderness.   Mouth/Throat: Uvula is midline and mucous membranes are normal. Posterior oropharyngeal erythema present. No tonsillar exudate.   Eyes: Conjunctivae, EOM and lids are normal. Pupils are equal, round, and reactive to light.   Neck: Trachea normal.   Cardiovascular: Normal rate, regular rhythm, S1 normal, S2 normal and normal heart sounds.   Pulmonary/Chest: Effort normal and breath sounds normal. No respiratory distress. He exhibits tenderness.   Abdominal: Normal appearance and bowel sounds are normal. There is no tenderness. There is no rigidity, no rebound, no guarding and no CVA tenderness. No hernia.   Musculoskeletal: Normal range of motion.   Lymphadenopathy:     He has no cervical adenopathy.   Neurological: He is alert and oriented to person, place, and time. He has normal strength.   Skin: Skin is warm, dry and intact. No rash noted.   Psychiatric: He has a normal mood and affect. His speech is normal.   Vitals reviewed.        Assessment/Plan   Michael was seen today for cough, headache and earache.    Diagnoses and all orders for this visit:    Acute non-recurrent maxillary sinusitis    Suprapubic discomfort  -     Urine Culture - Urine, Urine, Clean Catch; Future    Other orders  -     amoxicillin (AMOXIL) 875 MG tablet; Take 1 tablet by mouth 2 (Two) Times a Day for 10 days.  -     predniSONE (DELTASONE) 20 MG tablet; Take 1 tablet by mouth 2 (Two) Times a Day for 5 days.  -     benzonatate (TESSALON PERLES) 100 MG  capsule; 1 to 2 tid prn cough

## 2018-12-28 NOTE — PATIENT INSTRUCTIONS

## 2018-12-30 LAB
BACTERIA UR CULT: NO GROWTH
BACTERIA UR CULT: NORMAL

## 2018-12-31 ENCOUNTER — TELEPHONE (OUTPATIENT)
Dept: RETAIL CLINIC | Facility: CLINIC | Age: 58
End: 2018-12-31

## 2019-03-11 ENCOUNTER — OFFICE VISIT (OUTPATIENT)
Dept: RETAIL CLINIC | Facility: CLINIC | Age: 59
End: 2019-03-11

## 2019-03-11 VITALS
OXYGEN SATURATION: 97 % | HEART RATE: 98 BPM | RESPIRATION RATE: 16 BRPM | DIASTOLIC BLOOD PRESSURE: 82 MMHG | TEMPERATURE: 99.2 F | SYSTOLIC BLOOD PRESSURE: 128 MMHG

## 2019-03-11 DIAGNOSIS — J10.1 INFLUENZA A: Primary | ICD-10-CM

## 2019-03-11 LAB
EXPIRATION DATE: ABNORMAL
FLUAV AG NPH QL: POSITIVE
FLUBV AG NPH QL: NEGATIVE
INTERNAL CONTROL: ABNORMAL
Lab: ABNORMAL

## 2019-03-11 PROCEDURE — 87804 INFLUENZA ASSAY W/OPTIC: CPT | Performed by: NURSE PRACTITIONER

## 2019-03-11 PROCEDURE — 99213 OFFICE O/P EST LOW 20 MIN: CPT | Performed by: NURSE PRACTITIONER

## 2019-03-11 RX ORDER — DEXTROMETHORPHAN HYDROBROMIDE AND PROMETHAZINE HYDROCHLORIDE 15; 6.25 MG/5ML; MG/5ML
5 SYRUP ORAL 4 TIMES DAILY PRN
Qty: 140 ML | Refills: 0 | Status: SHIPPED | OUTPATIENT
Start: 2019-03-11 | End: 2019-03-18

## 2019-03-11 NOTE — PATIENT INSTRUCTIONS

## 2019-03-11 NOTE — PROGRESS NOTES
"Kody Lopez is a 58 y.o. male.     Patient did not receive influenza vaccination this season.       Influenza   This is a new problem. The current episode started in the past 7 days. The problem occurs constantly. The problem has been unchanged. Associated symptoms include anorexia, chills, congestion, coughing, diaphoresis, fatigue, a fever (not measured but believes he has had fever ), headaches, myalgias, nausea and a sore throat (\"scratchy, dry\"). Pertinent negatives include no change in bowel habit, chest pain, neck pain, swollen glands, vomiting or weakness. The symptoms are aggravated by exertion. Treatments tried: dayquil, peptobismol. The treatment provided mild relief.       The following portions of the patient's history were reviewed and updated as appropriate: allergies, current medications, past medical history, past social history, past surgical history and problem list.    Review of Systems   Constitutional: Positive for appetite change (diminished), chills, diaphoresis, fatigue and fever (not measured but believes he has had fever ).   HENT: Positive for congestion, postnasal drip, rhinorrhea, sneezing and sore throat (\"scratchy, dry\"). Negative for ear discharge, ear pain, mouth sores, nosebleeds, sinus pressure, trouble swallowing and voice change.    Eyes: Negative for redness and itching.   Respiratory: Positive for cough. Negative for chest tightness, shortness of breath, wheezing and stridor.    Cardiovascular: Negative for chest pain and palpitations.   Gastrointestinal: Positive for anorexia and nausea. Negative for change in bowel habit, diarrhea and vomiting.   Genitourinary: Negative for decreased urine volume.   Musculoskeletal: Positive for myalgias. Negative for neck pain.   Allergic/Immunologic: Negative for environmental allergies and immunocompromised state.   Neurological: Positive for headaches. Negative for dizziness, syncope and weakness.       Objective   Physical " Exam   Constitutional: He is oriented to person, place, and time. He appears well-developed and well-nourished. He is cooperative.  Non-toxic appearance. He does not appear ill. No distress.   HENT:   Right Ear: External ear and ear canal normal. Tympanic membrane is scarred. Tympanic membrane is not perforated and not erythematous. A middle ear effusion is present.   Left Ear: External ear and ear canal normal. Tympanic membrane is scarred. Tympanic membrane is not perforated and not erythematous.   Nose: Mucosal edema and rhinorrhea present. Right sinus exhibits no maxillary sinus tenderness and no frontal sinus tenderness. Left sinus exhibits no maxillary sinus tenderness and no frontal sinus tenderness.   Mouth/Throat: Uvula is midline, oropharynx is clear and moist and mucous membranes are normal. No oral lesions. No uvula swelling. No oropharyngeal exudate, posterior oropharyngeal edema or posterior oropharyngeal erythema. Tonsils are 2+ on the right. Tonsils are 2+ on the left. No tonsillar exudate.   Eyes: Conjunctivae and lids are normal.   Cardiovascular: Normal rate and regular rhythm.   Pulmonary/Chest: Effort normal and breath sounds normal.   Abdominal: Soft. Bowel sounds are normal. There is no tenderness.   Lymphadenopathy:     He has no cervical adenopathy.   Neurological: He is alert and oriented to person, place, and time.   Skin: Skin is warm and dry. He is not diaphoretic.   Vitals reviewed.      Assessment/Plan   Michael was seen today for fever, nausea, fatigue, generalized body aches and nasal congestion.    Diagnoses and all orders for this visit:    Influenza A  -     promethazine-dextromethorphan (PROMETHAZINE-DM) 6.25-15 MG/5ML syrup; Take 5 mL by mouth 4 (Four) Times a Day As Needed for Cough for up to 7 days.  -     POCT Influenza A/B          -     Rest, BRAT diet: advance as tolerated, increase H2O intake        -     F/U with PCP for persistent symptoms or UC/ER for worsening  symptoms     Lab Results (last 24 hours)     Procedure Component Value Units Date/Time    POCT Influenza A/B [[id number]]  (Abnormal) Collected:  03/11/19 1003    Specimen:  Swab Updated:  03/11/19 1005     Rapid Influenza A Ag Positive     Rapid Influenza B Ag Negative     Internal Control Passed     Lot Number [id number]     Expiration Date 12/31/20

## 2019-05-15 ENCOUNTER — OFFICE VISIT (OUTPATIENT)
Dept: FAMILY MEDICINE CLINIC | Facility: CLINIC | Age: 59
End: 2019-05-15

## 2019-05-15 VITALS
DIASTOLIC BLOOD PRESSURE: 80 MMHG | HEIGHT: 72 IN | WEIGHT: 315 LBS | OXYGEN SATURATION: 94 % | HEART RATE: 109 BPM | TEMPERATURE: 98.5 F | SYSTOLIC BLOOD PRESSURE: 134 MMHG | BODY MASS INDEX: 42.66 KG/M2

## 2019-05-15 DIAGNOSIS — Z00.00 HEALTHCARE MAINTENANCE: ICD-10-CM

## 2019-05-15 DIAGNOSIS — R13.10 DYSPHAGIA, UNSPECIFIED TYPE: ICD-10-CM

## 2019-05-15 DIAGNOSIS — R07.89 CHEST TIGHTNESS OR PRESSURE: ICD-10-CM

## 2019-05-15 DIAGNOSIS — R05.9 COUGH: Primary | ICD-10-CM

## 2019-05-15 DIAGNOSIS — Z12.11 COLON CANCER SCREENING: ICD-10-CM

## 2019-05-15 DIAGNOSIS — J30.9 ALLERGIC RHINITIS, UNSPECIFIED SEASONALITY, UNSPECIFIED TRIGGER: ICD-10-CM

## 2019-05-15 DIAGNOSIS — K21.9 GASTROESOPHAGEAL REFLUX DISEASE, ESOPHAGITIS PRESENCE NOT SPECIFIED: ICD-10-CM

## 2019-05-15 LAB
ALBUMIN SERPL-MCNC: 3.7 G/DL (ref 3.5–5.2)
ALBUMIN/GLOB SERPL: 1.1 G/DL
ALP SERPL-CCNC: 68 U/L (ref 39–117)
ALT SERPL W P-5'-P-CCNC: 24 U/L (ref 1–41)
ANION GAP SERPL CALCULATED.3IONS-SCNC: 11.4 MMOL/L
AST SERPL-CCNC: 24 U/L (ref 1–40)
BILIRUB SERPL-MCNC: 0.4 MG/DL (ref 0.2–1.2)
BUN BLD-MCNC: 10 MG/DL (ref 6–20)
BUN/CREAT SERPL: 9.7 (ref 7–25)
CALCIUM SPEC-SCNC: 9.7 MG/DL (ref 8.6–10.5)
CHLORIDE SERPL-SCNC: 98 MMOL/L (ref 98–107)
CO2 SERPL-SCNC: 26.6 MMOL/L (ref 22–29)
CREAT BLD-MCNC: 1.03 MG/DL (ref 0.76–1.27)
ERYTHROCYTE [DISTWIDTH] IN BLOOD BY AUTOMATED COUNT: 14.3 % (ref 12.3–15.4)
GFR SERPL CREATININE-BSD FRML MDRD: 75 ML/MIN/1.73
GLOBULIN UR ELPH-MCNC: 3.3 GM/DL
GLUCOSE BLD-MCNC: 82 MG/DL (ref 65–99)
HCT VFR BLD AUTO: 44.7 % (ref 37.5–51)
HGB BLD-MCNC: 15.1 G/DL (ref 13–17.7)
LYMPHOCYTES # BLD AUTO: 1.9 10*3/MM3 (ref 0.7–3.1)
LYMPHOCYTES NFR BLD AUTO: 22.8 % (ref 19.6–45.3)
MCH RBC QN AUTO: 28.8 PG (ref 26.6–33)
MCHC RBC AUTO-ENTMCNC: 33.8 G/DL (ref 31.5–35.7)
MCV RBC AUTO: 85.2 FL (ref 79–97)
MONOCYTES # BLD AUTO: 0.6 10*3/MM3 (ref 0.1–0.9)
MONOCYTES NFR BLD AUTO: 7.1 % (ref 5–12)
NEUTROPHILS # BLD AUTO: 5.7 10*3/MM3 (ref 1.7–7)
NEUTROPHILS NFR BLD AUTO: 70.1 % (ref 42.7–76)
PLATELET # BLD AUTO: 260 10*3/MM3 (ref 140–450)
PMV BLD AUTO: 6.9 FL (ref 6–12)
POTASSIUM BLD-SCNC: 4.8 MMOL/L (ref 3.5–5.2)
PROT SERPL-MCNC: 7 G/DL (ref 6–8.5)
RBC # BLD AUTO: 5.24 10*6/MM3 (ref 4.14–5.8)
SODIUM BLD-SCNC: 136 MMOL/L (ref 136–145)
TSH SERPL DL<=0.05 MIU/L-ACNC: 1.93 MIU/ML (ref 0.27–4.2)
WBC NRBC COR # BLD: 8.2 10*3/MM3 (ref 3.4–10.8)

## 2019-05-15 PROCEDURE — 71046 X-RAY EXAM CHEST 2 VIEWS: CPT | Performed by: NURSE PRACTITIONER

## 2019-05-15 PROCEDURE — 93000 ELECTROCARDIOGRAM COMPLETE: CPT | Performed by: NURSE PRACTITIONER

## 2019-05-15 PROCEDURE — 84443 ASSAY THYROID STIM HORMONE: CPT | Performed by: NURSE PRACTITIONER

## 2019-05-15 PROCEDURE — 80053 COMPREHEN METABOLIC PANEL: CPT | Performed by: NURSE PRACTITIONER

## 2019-05-15 PROCEDURE — 99214 OFFICE O/P EST MOD 30 MIN: CPT | Performed by: NURSE PRACTITIONER

## 2019-05-15 PROCEDURE — 85025 COMPLETE CBC W/AUTO DIFF WBC: CPT | Performed by: NURSE PRACTITIONER

## 2019-05-15 PROCEDURE — 36415 COLL VENOUS BLD VENIPUNCTURE: CPT | Performed by: NURSE PRACTITIONER

## 2019-05-15 RX ORDER — PREDNISONE 20 MG/1
TABLET ORAL
COMMUNITY
Start: 2019-04-20 | End: 2019-06-20

## 2019-05-15 RX ORDER — OMEPRAZOLE 40 MG/1
40 CAPSULE, DELAYED RELEASE ORAL DAILY
Qty: 30 CAPSULE | Refills: 3 | Status: SHIPPED | OUTPATIENT
Start: 2019-05-15 | End: 2019-07-08 | Stop reason: SINTOL

## 2019-05-15 RX ORDER — TESTOSTERONE CYPIONATE 200 MG/ML
INJECTION, SOLUTION INTRAMUSCULAR
COMMUNITY
Start: 2019-03-22 | End: 2021-03-08

## 2019-05-15 RX ORDER — ALBUTEROL SULFATE 90 UG/1
1 AEROSOL, METERED RESPIRATORY (INHALATION) EVERY 4 HOURS PRN
COMMUNITY
Start: 2019-04-20 | End: 2019-07-08

## 2019-05-15 RX ORDER — MONTELUKAST SODIUM 10 MG/1
10 TABLET ORAL NIGHTLY
Qty: 30 TABLET | Refills: 3 | Status: SHIPPED | OUTPATIENT
Start: 2019-05-15 | End: 2019-07-08

## 2019-05-15 RX ORDER — TAMSULOSIN HYDROCHLORIDE 0.4 MG/1
CAPSULE ORAL
COMMUNITY
Start: 2019-04-30 | End: 2019-07-08

## 2019-05-15 RX ORDER — AZITHROMYCIN 250 MG/1
TABLET, FILM COATED ORAL
Qty: 6 TABLET | Refills: 0 | Status: SHIPPED | OUTPATIENT
Start: 2019-05-15 | End: 2019-06-20

## 2019-05-15 NOTE — PATIENT INSTRUCTIONS
ekg and cxr today,   Labs today will call with results.   Start zpack take as directed.   Trial singular 10mg nightly, may cont zyrtec in am if needed,   Cont flonase and mucinex at home as needed.   Stress test ordered will call with appt.   Trial omeprazole 40mg daily in am for 2-4 weeks.   Low acid diet.   If any worsening symptoms, CP, advised to go to the ER.   encouraged to make f/u for over due physical, fasting labs.   Increase fluid intake, get plenty of rest.   Patient agrees with plan of care and understands instructions. Call if worsening symptoms or any problems or concerns.     Food Choices for Gastroesophageal Reflux Disease, Adult  When you have gastroesophageal reflux disease (GERD), the foods you eat and your eating habits are very important. Choosing the right foods can help ease your discomfort. Think about working with a nutrition specialist (dietitian) to help you make good choices.  What are tips for following this plan?  Meals  · Choose healthy foods that are low in fat, such as fruits, vegetables, whole grains, low-fat dairy products, and lean meat, fish, and poultry.  · Eat small meals often instead of 3 large meals a day. Eat your meals slowly, and in a place where you are relaxed. Avoid bending over or lying down until 2-3 hours after eating.  · Avoid eating meals 2-3 hours before bed.  · Avoid drinking a lot of liquid with meals.  · Cook foods using methods other than frying. Bake, grill, or broil food instead.  · Avoid or limit:  ? Chocolate.  ? Peppermint or spearmint.  ? Alcohol.  ? Pepper.  ? Black and decaffeinated coffee.  ? Black and decaffeinated tea.  ? Bubbly (carbonated) soft drinks.  ? Caffeinated energy drinks and soft drinks.  · Limit high-fat foods such as:  ? Fatty meat or fried foods.  ? Whole milk, cream, butter, or ice cream.  ? Nuts and nut butters.  ? Pastries, donuts, and sweets made with butter or shortening.  · Avoid foods that cause symptoms. These foods may be  different for everyone. Common foods that cause symptoms include:  ? Tomatoes.  ? Oranges, taye, and limes.  ? Peppers.  ? Spicy food.  ? Onions and garlic.  ? Vinegar.  Lifestyle    · Maintain a healthy weight. Ask your doctor what weight is healthy for you. If you need to lose weight, work with your doctor to do so safely.  · Exercise for at least 30 minutes for 5 or more days each week, or as told by your doctor.  · Wear loose-fitting clothes.  · Do not smoke. If you need help quitting, ask your doctor.  · Sleep with the head of your bed higher than your feet. Use a wedge under the mattress or blocks under the bed frame to raise the head of the bed.  Summary  · When you have gastroesophageal reflux disease (GERD), food and lifestyle choices are very important in easing your symptoms.  · Eat small meals often instead of 3 large meals a day. Eat your meals slowly, and in a place where you are relaxed.  · Limit high-fat foods such as fatty meat or fried foods.  · Avoid bending over or lying down until 2-3 hours after eating.  · Avoid peppermint and spearmint, caffeine, alcohol, and chocolate.  This information is not intended to replace advice given to you by your health care provider. Make sure you discuss any questions you have with your health care provider.  Document Released: 06/18/2013 Document Revised: 01/23/2018 Document Reviewed: 01/23/2018  [employer] Patient Education © 2019 [employer]

## 2019-05-15 NOTE — PROGRESS NOTES
Procedure     ECG 12 Lead  Date/Time: 5/15/2019 12:24 PM  Performed by: Maggy Rivas APRN  Authorized by: Maggy Rivas APRN   Comparison: compared with previous ECG from 4/23/2007  Comparison to previous ECG: Sinus rhythm  Rhythm: sinus rhythm  Rate: normal  QRS axis: normal    Clinical impression: normal ECG

## 2019-05-15 NOTE — PROGRESS NOTES
"Subjective   Michael Lopez is a 58 y.o. male.     History of Present Illness   C/o cough, nasal congestion, went to [facility] 3 weeks ago, given prednisone 20mg x5 days and albuterol inhaler, also taking zyrtec.he has PND, chest congestion, he has hx of seasonal allergies. He does have hx of asthma 20 years ago. He has non productive cough, he has sinus pressure. Denies smoking. He does wear cpap. He states cough has improved, still having chest tightness. He has also tried mucinex.    also would like to schedule colonoscopy. He is not fasting today.   States he has trouble swallowing at times, has \"air in throat\" when he swallows at times, he does have heart burn at times. He tried medication years ago, not recently. Denies abd pain.       The following portions of the patient's history were reviewed and updated as appropriate: allergies, current medications, past family history, past medical history, past social history, past surgical history and problem list.    Review of Systems   Constitutional: Negative for chills, diaphoresis and fever.   HENT: Positive for congestion, postnasal drip, rhinorrhea and sinus pressure. Negative for ear pain and sore throat.    Respiratory: Positive for cough and shortness of breath. Negative for wheezing.    Cardiovascular: Negative for chest pain, palpitations and leg swelling.   Musculoskeletal: Negative for arthralgias and myalgias.   Allergic/Immunologic: Positive for environmental allergies.   Neurological: Negative for dizziness, light-headedness and headache.   All other systems reviewed and are negative.      Objective   Physical Exam   Constitutional: He is oriented to person, place, and time. He appears well-developed and well-nourished.   HENT:   Head: Normocephalic.   Right Ear: Tympanic membrane and ear canal normal.   Left Ear: Tympanic membrane and ear canal normal.   Nose: Right sinus exhibits maxillary sinus tenderness. Left sinus exhibits maxillary sinus " tenderness.   Mouth/Throat: Uvula is midline, oropharynx is clear and moist and mucous membranes are normal.   Eyes: Pupils are equal, round, and reactive to light.   Neck: Normal range of motion. No thyromegaly present.   Cardiovascular: Normal rate, regular rhythm, normal heart sounds and intact distal pulses.   Pulmonary/Chest: Effort normal and breath sounds normal. He has no decreased breath sounds. He has no wheezes. He has no rhonchi. He has no rales.   Musculoskeletal: Normal range of motion.   Lymphadenopathy:     He has no cervical adenopathy.   Neurological: He is alert and oriented to person, place, and time.   Skin: Skin is warm and dry.   Psychiatric: He has a normal mood and affect. His behavior is normal.   Nursing note and vitals reviewed.    cxr 2v in office for cough, no comparison shows  NAD,awaiting radiology over read.     Assessment/Plan   Michael was seen today for follow-up, shortness of breath and nasal congestion.    Diagnoses and all orders for this visit:    Cough  -     XR Chest 2 View  -     CBC & Differential; Future  -     Comprehensive metabolic panel; Future  -     TSH; Future  -     ECG 12 Lead    Colon cancer screening  -     XR Chest 2 View  -     CBC & Differential; Future  -     Comprehensive metabolic panel; Future  -     TSH; Future  -     Ambulatory Referral For Screening Colonoscopy    Healthcare maintenance  -     XR Chest 2 View  -     CBC & Differential; Future  -     Comprehensive metabolic panel; Future  -     TSH; Future    Chest tightness or pressure  -     XR Chest 2 View  -     CBC & Differential; Future  -     Comprehensive metabolic panel; Future  -     TSH; Future  -     ECG 12 Lead  -     Stress test; Future    Allergic rhinitis, unspecified seasonality, unspecified trigger  -     Ambulatory Referral to Allergy    Dysphagia, unspecified type    Gastroesophageal reflux disease, esophagitis presence not specified    Other orders  -     azithromycin (ZITHROMAX)  250 MG tablet; Take 2 tablets the first day, then 1 tablet daily for 4 days.  -     montelukast (SINGULAIR) 10 MG tablet; Take 1 tablet by mouth Every Night.  -     omeprazole (priLOSEC) 40 MG capsule; Take 1 capsule by mouth Daily.      ekg and cxr today,   Labs today will call with results.   Start zpack take as directed.   Trial singular 10mg nightly, may cont zyrtec in am if needed,   Cont flonase and mucinex at home as needed.   Stress test ordered will call with appt.   Trial omeprazole 40mg daily in am for 2-4 weeks.   Low acid diet.   If any worsening symptoms, CP, advised to go to the ER.   encouraged to make f/u for over due physical, fasting labs.   Increase fluid intake, get plenty of rest.   Patient agrees with plan of care and understands instructions. Call if worsening symptoms or any problems or concerns

## 2019-06-03 ENCOUNTER — TELEPHONE (OUTPATIENT)
Dept: FAMILY MEDICINE CLINIC | Facility: CLINIC | Age: 59
End: 2019-06-03

## 2019-06-03 DIAGNOSIS — R13.10 DYSPHAGIA, UNSPECIFIED TYPE: Primary | ICD-10-CM

## 2019-06-03 DIAGNOSIS — K21.9 GASTROESOPHAGEAL REFLUX DISEASE, ESOPHAGITIS PRESENCE NOT SPECIFIED: ICD-10-CM

## 2019-06-06 ENCOUNTER — OFFICE VISIT (OUTPATIENT)
Dept: FAMILY MEDICINE CLINIC | Facility: CLINIC | Age: 59
End: 2019-06-06

## 2019-06-06 VITALS
WEIGHT: 315 LBS | BODY MASS INDEX: 42.66 KG/M2 | DIASTOLIC BLOOD PRESSURE: 82 MMHG | HEIGHT: 72 IN | OXYGEN SATURATION: 94 % | SYSTOLIC BLOOD PRESSURE: 138 MMHG | HEART RATE: 103 BPM | TEMPERATURE: 98.2 F

## 2019-06-06 DIAGNOSIS — J30.9 ALLERGIC RHINITIS, UNSPECIFIED SEASONALITY, UNSPECIFIED TRIGGER: Primary | ICD-10-CM

## 2019-06-06 DIAGNOSIS — J01.00 ACUTE MAXILLARY SINUSITIS, RECURRENCE NOT SPECIFIED: ICD-10-CM

## 2019-06-06 PROCEDURE — 99213 OFFICE O/P EST LOW 20 MIN: CPT | Performed by: NURSE PRACTITIONER

## 2019-06-06 RX ORDER — AMOXICILLIN 875 MG/1
875 TABLET, COATED ORAL 2 TIMES DAILY
Qty: 14 TABLET | Refills: 0 | Status: SHIPPED | OUTPATIENT
Start: 2019-06-06 | End: 2019-06-13

## 2019-06-06 RX ORDER — AZELASTINE 1 MG/ML
2 SPRAY, METERED NASAL 2 TIMES DAILY
Qty: 1 EACH | Refills: 12 | Status: SHIPPED | OUTPATIENT
Start: 2019-06-06 | End: 2019-07-08

## 2019-06-06 RX ORDER — ECHINACEA PURPUREA EXTRACT 125 MG
1 TABLET ORAL AS NEEDED
COMMUNITY
End: 2019-06-20

## 2019-06-06 RX ORDER — DIPHENHYDRAMINE HCL 25 MG
25 CAPSULE ORAL EVERY 6 HOURS PRN
COMMUNITY
End: 2019-06-20

## 2019-06-06 RX ORDER — NAPROXEN SODIUM 220 MG
220 TABLET ORAL EVERY 12 HOURS PRN
COMMUNITY
End: 2021-03-08

## 2019-06-06 RX ORDER — ACETAMINOPHEN 500 MG
500 TABLET ORAL EVERY 6 HOURS PRN
COMMUNITY
End: 2019-10-01

## 2019-06-06 NOTE — PATIENT INSTRUCTIONS
Start amoxicillin 875mg 2x day for 7 days.  Cont zyrtec, singular, flonase, trial astelin nasal spray 2 sprays bid as tolerated.   Cont f/u with allergy as scheduled.   Increase fluid intake, get plenty of rest.   Patient agrees with plan of care and understands instructions. Call if worsening symptoms or any problems or concerns.

## 2019-06-06 NOTE — PROGRESS NOTES
Subjective   Michael Lopez is a 58 y.o. male.     History of Present Illness   C/o cough, drainage, PND, hx of seasonal allergies, taking zyrtec, singular, flonase, denies smoking, hx of asthma 20 years ago, wears cpap, he was seen on 5/15/19, given zpack, singular, also given omeprazole for gerd, normal cxr on 5/15/19, tried prednisone taper and albuterol inhaler from [facility] prior to last visit, c/o drainage at night when he lays down. States he has thick drainage, trouble sleeping d/t drainage, he states cough has improved, he tried to elevated HOB but did not help, he has upcoming allergy appt next week, family allergy and asthma. He does have sinus pressure and ear pain.       The following portions of the patient's history were reviewed and updated as appropriate: allergies, current medications, past family history, past medical history, past social history, past surgical history and problem list.    Review of Systems   Constitutional: Negative for chills, diaphoresis and fever.   HENT: Positive for ear pain, postnasal drip, rhinorrhea and sinus pressure. Negative for sore throat.    Respiratory: Negative for cough and shortness of breath.    Cardiovascular: Negative for chest pain.   Musculoskeletal: Negative for arthralgias and myalgias.   Allergic/Immunologic: Positive for environmental allergies.   Neurological: Negative for dizziness, light-headedness and headache.   All other systems reviewed and are negative.      Objective   Physical Exam   Constitutional: He is oriented to person, place, and time. He appears well-developed and well-nourished.   HENT:   Head: Normocephalic.   Right Ear: Ear canal normal. Tympanic membrane is erythematous.   Left Ear: Tympanic membrane and ear canal normal.   Nose: Mucosal edema and septal deviation present. Right sinus exhibits maxillary sinus tenderness. Left sinus exhibits maxillary sinus tenderness.   Mouth/Throat: Uvula is midline and mucous membranes are  normal. Posterior oropharyngeal erythema present.   Eyes: Pupils are equal, round, and reactive to light.   Neck: Normal range of motion.   Cardiovascular: Normal rate, regular rhythm and normal heart sounds.   Pulmonary/Chest: Effort normal and breath sounds normal. He has no decreased breath sounds. He has no wheezes. He has no rhonchi. He has no rales.   Musculoskeletal: Normal range of motion.   Lymphadenopathy:     He has no cervical adenopathy.   Neurological: He is alert and oriented to person, place, and time.   Skin: Skin is warm and dry.   Psychiatric: He has a normal mood and affect. His behavior is normal.   Nursing note and vitals reviewed.        Assessment/Plan   Michael was seen today for nasal congestion.    Diagnoses and all orders for this visit:    Allergic rhinitis, unspecified seasonality, unspecified trigger    Acute maxillary sinusitis, recurrence not specified    Other orders  -     azelastine (ASTELIN) 0.1 % nasal spray; 2 sprays into the nostril(s) as directed by provider 2 (Two) Times a Day. Use in each nostril as directed  -     amoxicillin (AMOXIL) 875 MG tablet; Take 1 tablet by mouth 2 (Two) Times a Day for 7 days.        Start amoxicillin 875mg 2x day for 7 days.  Cont zyrtec, singular, flonase, trial astelin nasal spray 2 sprays bid as tolerated.   Cont f/u with allergy as scheduled.   Increase fluid intake, get plenty of rest.   Patient agrees with plan of care and understands instructions. Call if worsening symptoms or any problems or concerns.

## 2019-06-10 ENCOUNTER — PREP FOR SURGERY (OUTPATIENT)
Dept: OTHER | Facility: HOSPITAL | Age: 59
End: 2019-06-10

## 2019-06-10 DIAGNOSIS — Z12.11 SCREEN FOR COLON CANCER: Primary | ICD-10-CM

## 2019-06-11 ENCOUNTER — TRANSCRIBE ORDERS (OUTPATIENT)
Dept: ADMINISTRATIVE | Facility: HOSPITAL | Age: 59
End: 2019-06-11

## 2019-06-11 DIAGNOSIS — M79.662 PAIN IN BOTH LOWER LEGS: Primary | ICD-10-CM

## 2019-06-11 DIAGNOSIS — M79.661 PAIN IN BOTH LOWER LEGS: Primary | ICD-10-CM

## 2019-06-20 ENCOUNTER — OFFICE VISIT (OUTPATIENT)
Dept: FAMILY MEDICINE CLINIC | Facility: CLINIC | Age: 59
End: 2019-06-20

## 2019-06-20 VITALS
HEIGHT: 72 IN | WEIGHT: 315 LBS | BODY MASS INDEX: 42.66 KG/M2 | DIASTOLIC BLOOD PRESSURE: 78 MMHG | HEART RATE: 89 BPM | TEMPERATURE: 97.8 F | SYSTOLIC BLOOD PRESSURE: 128 MMHG | OXYGEN SATURATION: 97 %

## 2019-06-20 DIAGNOSIS — I10 HYPERTENSION, ESSENTIAL: ICD-10-CM

## 2019-06-20 DIAGNOSIS — Z00.00 ENCOUNTER FOR ANNUAL GENERAL MEDICAL EXAMINATION WITHOUT ABNORMAL FINDINGS IN ADULT: Primary | ICD-10-CM

## 2019-06-20 DIAGNOSIS — R35.0 BENIGN PROSTATIC HYPERPLASIA WITH URINARY FREQUENCY: ICD-10-CM

## 2019-06-20 DIAGNOSIS — R60.0 BILATERAL LEG EDEMA: ICD-10-CM

## 2019-06-20 DIAGNOSIS — Z13.220 LIPID SCREENING: ICD-10-CM

## 2019-06-20 DIAGNOSIS — Z51.81 MEDICATION MONITORING ENCOUNTER: ICD-10-CM

## 2019-06-20 DIAGNOSIS — N40.1 BENIGN PROSTATIC HYPERPLASIA WITH URINARY FREQUENCY: ICD-10-CM

## 2019-06-20 LAB
BACTERIA UR QL AUTO: NORMAL /HPF
BILIRUB UR QL STRIP: NEGATIVE
CHOLEST SERPL-MCNC: 186 MG/DL (ref 0–200)
CLARITY UR: CLEAR
COLOR UR: YELLOW
GLUCOSE UR STRIP-MCNC: NEGATIVE MG/DL
HDLC SERPL-MCNC: 41 MG/DL (ref 40–60)
HGB UR QL STRIP.AUTO: NEGATIVE
KETONES UR QL STRIP: NEGATIVE
LDLC SERPL CALC-MCNC: 117 MG/DL (ref 0–100)
LDLC/HDLC SERPL: 2.85 {RATIO}
LEUKOCYTE ESTERASE UR QL STRIP.AUTO: NEGATIVE
NITRITE UR QL STRIP: NEGATIVE
PH UR STRIP.AUTO: 7 [PH] (ref 4.6–8)
PROT UR QL STRIP: NEGATIVE
RBC # UR: NORMAL /HPF
REF LAB TEST METHOD: NORMAL
SP GR UR STRIP: 1.01 (ref 1–1.03)
SQUAMOUS #/AREA URNS HPF: NORMAL /HPF
TRIGL SERPL-MCNC: 140 MG/DL (ref 0–150)
UROBILINOGEN UR QL STRIP: NORMAL
VLDLC SERPL-MCNC: 28 MG/DL (ref 5–40)
WBC UR QL AUTO: NORMAL /HPF

## 2019-06-20 PROCEDURE — 99396 PREV VISIT EST AGE 40-64: CPT | Performed by: INTERNAL MEDICINE

## 2019-06-20 PROCEDURE — 99214 OFFICE O/P EST MOD 30 MIN: CPT | Performed by: INTERNAL MEDICINE

## 2019-06-20 PROCEDURE — 81001 URINALYSIS AUTO W/SCOPE: CPT | Performed by: INTERNAL MEDICINE

## 2019-06-20 PROCEDURE — 80061 LIPID PANEL: CPT | Performed by: INTERNAL MEDICINE

## 2019-06-20 NOTE — PROGRESS NOTES
Subjective   Michael Lopez is a 58 y.o. male.   Patient here for annual adult general medical evaluation does have BPH is followed by urology for that.  Having some increasing leg edema as well.  Blood pressure is satisfactory to best for his knowledge.  History of Present Illness annual adult medical evaluation.  Is trying to watch weight fairly active work wise is eliminated soft drinks hopefully he will lose 15 pounds is over that.  Does have leg edema blood pressure satisfactory 120/78 noted he is on Norvasc which may be contributory to the legs.  Does have some mild chronic back discomfort she will has to restrict his activity tries to do a gentle walking etc. skin pain is also having some have some mild discomfort both knees which is probably a significant try to lose weight.  SEES URO YEARLY BPH also has recurrent prostatitis followed by Dr. Vinicio Jackman  MILD LEG EDEMA  Review of Systems   HENT:        Allergic rhinitis currently under fairly good control with allergy shots also has asthma also   Genitourinary:        Nocturia of 2+  this recurrent prostatitis is followed by Dr. Vinicio Jackman no known prostate cancer.  Due to see Dr. Jackman again in 1 month's time   Musculoskeletal:        Recurrent lumbar pain with prior evaluation, bilateral knee pain mild   All other systems reviewed and are negative.      Objective   Vitals:    06/20/19 0853   BP: 128/78   Pulse: 89   Temp: 97.8 °F (36.6 °C)   SpO2: 97%   Weight: (!) 166 kg (366 lb 9.6 oz)     Physical Exam   Constitutional: He appears well-developed and well-nourished.   HENT:   Head: Normocephalic and atraumatic.   Eyes: Conjunctivae are normal. Pupils are equal, round, and reactive to light.   Neck: No tracheal deviation present.   No carotid bruits   Cardiovascular: Normal rate, regular rhythm and normal heart sounds.   Left and right carotid, radial, femoral, posterior tibial and dorsalis pedis pulse are all 2+.   Pulmonary/Chest: Effort normal and breath  sounds normal.   Abdominal: Soft. Bowel sounds are normal.   Musculoskeletal:   No increased cervical, supra/infraclavicular, axillary or inguinal lymphadenopathy.   Neurological: He is alert.   Unremarkable gait and station   Skin: Skin is warm and dry.   Faint area of flaking right outer ear concern about potential early actinic keratosis patient is already scheduled to see dermatologist.   Nursing note and vitals reviewed.      No results found for: INR    Procedures    Assessment/Plan     1.  Encounter for annual adult general medical evaluation    2.  Hypertension controlled continue present medicine for now    3.  Bilateral leg edema weight pending lab i.e. UA if test is negative consider venous Doppler.  Also patient is trying to achieve weight loss and sodium restrictions help somewhat.    4.  Lipid screening await pending lab further recommendations    5.  Medication monitoring    6.  Allergic rhinitis/asthma well controlled is getting allergy shots.    7.  BPH followed by urology he is due to be seen again in 1 month by them.    Much of this encounter note is an electronic transcription/translation of spoken language to printed text.  The electronic translation of spoken language may permit erroneous, or at times, nonsensical words or phrases to be inadvertently transcribed.  Although I have reviewed the note for such errors, some may still exist. If there are questions or for further clarification, please contact me.

## 2019-06-21 ENCOUNTER — TELEPHONE (OUTPATIENT)
Dept: FAMILY MEDICINE CLINIC | Facility: CLINIC | Age: 59
End: 2019-06-21

## 2019-06-21 RX ORDER — AMLODIPINE BESYLATE 5 MG/1
5 TABLET ORAL DAILY
Qty: 30 TABLET | Refills: 5 | Status: SHIPPED | OUTPATIENT
Start: 2019-06-21 | End: 2019-08-07

## 2019-06-24 ENCOUNTER — HOSPITAL ENCOUNTER (OUTPATIENT)
Dept: CARDIOLOGY | Facility: HOSPITAL | Age: 59
Discharge: HOME | End: 2019-06-24
Admitting: NURSE PRACTITIONER

## 2019-06-24 DIAGNOSIS — R07.89 CHEST TIGHTNESS OR PRESSURE: ICD-10-CM

## 2019-06-24 LAB
BH CV STRESS BP STAGE 1: NORMAL
BH CV STRESS BP STAGE 2: NORMAL
BH CV STRESS DURATION MIN STAGE 1: 3
BH CV STRESS DURATION MIN STAGE 2: 1
BH CV STRESS DURATION SEC STAGE 1: 0
BH CV STRESS DURATION SEC STAGE 2: 8
BH CV STRESS GRADE STAGE 1: 10
BH CV STRESS GRADE STAGE 2: 12
BH CV STRESS HR STAGE 1: 140
BH CV STRESS HR STAGE 2: 150
BH CV STRESS METS STAGE 1: 5
BH CV STRESS METS STAGE 2: 7.5
BH CV STRESS PROTOCOL 1: NORMAL
BH CV STRESS RECOVERY BP: NORMAL MMHG
BH CV STRESS RECOVERY HR: 114 BPM
BH CV STRESS SPEED STAGE 1: 1.7
BH CV STRESS SPEED STAGE 2: 2.5
BH CV STRESS STAGE 1: 1
BH CV STRESS STAGE 2: 2
MAXIMAL PREDICTED HEART RATE: 164 BPM
PERCENT MAX PREDICTED HR: 91.46 %
STRESS BASELINE BP: NORMAL MMHG
STRESS BASELINE HR: 99 BPM
STRESS O2 SAT REST: 96 %
STRESS PERCENT HR: 108 %
STRESS POST ESTIMATED WORKLOAD: 6 METS
STRESS POST EXERCISE DUR MIN: 4 MIN
STRESS POST EXERCISE DUR SEC: 8 SEC
STRESS POST O2 SAT PEAK: 96 %
STRESS POST PEAK BP: NORMAL MMHG
STRESS POST PEAK HR: 150 BPM
STRESS TARGET HR: 139 BPM

## 2019-06-24 PROCEDURE — 93018 CV STRESS TEST I&R ONLY: CPT | Performed by: INTERNAL MEDICINE

## 2019-06-24 PROCEDURE — 93016 CV STRESS TEST SUPVJ ONLY: CPT | Performed by: INTERNAL MEDICINE

## 2019-06-24 PROCEDURE — 93017 CV STRESS TEST TRACING ONLY: CPT

## 2019-07-08 ENCOUNTER — OFFICE VISIT (OUTPATIENT)
Dept: SURGERY | Facility: CLINIC | Age: 59
End: 2019-07-08

## 2019-07-08 VITALS — OXYGEN SATURATION: 98 % | HEART RATE: 80 BPM | BODY MASS INDEX: 42.66 KG/M2 | WEIGHT: 315 LBS | HEIGHT: 72 IN

## 2019-07-08 DIAGNOSIS — R13.12 OROPHARYNGEAL DYSPHAGIA: Primary | ICD-10-CM

## 2019-07-08 PROCEDURE — 99203 OFFICE O/P NEW LOW 30 MIN: CPT | Performed by: SURGERY

## 2019-07-08 RX ORDER — CHOLECALCIFEROL (VITAMIN D3) 125 MCG
5 CAPSULE ORAL NIGHTLY
COMMUNITY
End: 2023-02-22

## 2019-07-08 NOTE — PROGRESS NOTES
Cc: Dysphasia    History of presenting illness:   This is a obese 58-year-old gentleman who describes a long-standing history of reflux disease.  He last had both an upper and lower endoscopy done in 2006.  Colon was normal and EGD showed mild Schatzki ring formation which was dilated with a 56 Estrada dilator with minimal resistance.  He says that over the past couple of months he describes more difficulty eating both solids and liquids and describes some gagging which sounds oral pharyngeal in nature.  There is not been any associated weight loss.  He was briefly started on a proton pump inhibitor, but stopped quickly because of some allergic reactions, which sounds like they were not related to his medications.    Past Medical History: hypertension, GERD, arthritis, obesity, sleep apnea, back pain, cor pulmonale    Past Surgical History: Significant for remote right inguinal hernia repair, colonoscopy in 2006    Medications: Tylenol, Norvasc, melatonin, Aleve, testosterone replacement    Allergies: None known    Social History: Patient is a non-smoker.  He admits to using alcohol once or twice a week.    Family History: Negative for colorectal cancer    Review of Systems:  Constitutional: Negative for weakness, change in activity or fever  Neck: no swollen glands  or odynophagia  Respiratory: negative for SOB, cough, hemoptysis or wheezing  Cardiovascular: negative for chest pain, palpitations or peripheral edema  Gastrointestinal: Positive for dysphasia and reflux      Physical Exam:   Body mass index 48.3  Discussed with patient increased perioperative risks associated with obesity including increased risks of DVT, infection, seromas, poor wound healing and hernias (with abdominal surgery).    General: alert and oriented, appropriate, no acute distress  Neck: Supple without lymphadenopathy or thyromegaly, trachea is in the midline  Respiratory: Lungs are clear bilaterally without wheezing, no use of accessory  muscles is noted  Cardiovascular: Regular rate and rhythm without murmur, no peripheral edema  Gastrointestinal: Obese, soft, no mass, no hernia, nontender    Laboratory data: None    Imaging data: None    Previous EGD and colonoscopy report from 2006 done by Dr. Kruse is reviewed by me.  The patient's colon was described as normal at that time.  Mild Schatzki ring dilated to 56 Cuban with minimal resistance at that time.      Assessment and plan:   -Dysphagia, oral pharyngeal phase, recommend upper endoscopy  -Acid reflux  -Evaluation for screening colonoscopy, recommend concomitant upper and lower endoscopy.  Risks including anesthetic risks, cardio pulmonary risks, bleeding and perforation discussed.  Patient is agreeable to proceed.      Genaro Blackwell MD, FACS  General, Minimally Invasive and Endoscopic Surgery  [facility], [place], operated by [facility]    [address], [address]  [place], [place], [address]  P: [phone]  F: [phone]

## 2019-08-07 ENCOUNTER — OFFICE VISIT (OUTPATIENT)
Dept: FAMILY MEDICINE CLINIC | Facility: CLINIC | Age: 59
End: 2019-08-07

## 2019-08-07 VITALS
HEART RATE: 71 BPM | OXYGEN SATURATION: 95 % | HEIGHT: 72 IN | DIASTOLIC BLOOD PRESSURE: 84 MMHG | SYSTOLIC BLOOD PRESSURE: 128 MMHG | TEMPERATURE: 97.1 F | WEIGHT: 315 LBS | BODY MASS INDEX: 42.66 KG/M2

## 2019-08-07 DIAGNOSIS — L30.9 DERMATITIS: ICD-10-CM

## 2019-08-07 DIAGNOSIS — I10 ESSENTIAL HYPERTENSION: ICD-10-CM

## 2019-08-07 DIAGNOSIS — R60.0 PEDAL EDEMA: Primary | ICD-10-CM

## 2019-08-07 PROCEDURE — 99214 OFFICE O/P EST MOD 30 MIN: CPT | Performed by: NURSE PRACTITIONER

## 2019-08-07 RX ORDER — FUROSEMIDE 20 MG/1
20 TABLET ORAL DAILY
COMMUNITY
Start: 2019-08-07

## 2019-08-07 RX ORDER — NIFEDIPINE 30 MG/1
30 TABLET, EXTENDED RELEASE ORAL DAILY
Qty: 30 TABLET | Refills: 3 | Status: SHIPPED | OUTPATIENT
Start: 2019-08-07 | End: 2019-12-23

## 2019-08-07 RX ORDER — TAMSULOSIN HYDROCHLORIDE 0.4 MG/1
1 CAPSULE ORAL NIGHTLY
COMMUNITY
End: 2021-10-28

## 2019-08-07 NOTE — PATIENT INSTRUCTIONS
"DASH Eating Plan  DASH stands for \"Dietary Approaches to Stop Hypertension.\" The DASH eating plan is a healthy eating plan that has been shown to reduce high blood pressure (hypertension). It may also reduce your risk for type 2 diabetes, heart disease, and stroke. The DASH eating plan may also help with weight loss.  What are tips for following this plan?    General guidelines  · Avoid eating more than 2,300 mg (milligrams) of salt (sodium) a day. If you have hypertension, you may need to reduce your sodium intake to 1,500 mg a day.  · Limit alcohol intake to no more than 1 drink a day for nonpregnant women and 2 drinks a day for men. One drink equals 12 oz of beer, 5 oz of wine, or 1½ oz of hard liquor.  · Work with your health care provider to maintain a healthy body weight or to lose weight. Ask what an ideal weight is for you.  · Get at least 30 minutes of exercise that causes your heart to beat faster (aerobic exercise) most days of the week. Activities may include walking, swimming, or biking.  · Work with your health care provider or diet and nutrition specialist (dietitian) to adjust your eating plan to your individual calorie needs.  Reading food labels    · Check food labels for the amount of sodium per serving. Choose foods with less than 5 percent of the Daily Value of sodium. Generally, foods with less than 300 mg of sodium per serving fit into this eating plan.  · To find whole grains, look for the word \"whole\" as the first word in the ingredient list.  Shopping  · Buy products labeled as \"low-sodium\" or \"no salt added.\"  · Buy fresh foods. Avoid canned foods and premade or frozen meals.  Cooking  · Avoid adding salt when cooking. Use salt-free seasonings or herbs instead of table salt or sea salt. Check with your health care provider or pharmacist before using salt substitutes.  · Do not be foods. Cook foods using healthy methods such as baking, boiling, grilling, and broiling instead.  · Cook with " heart-healthy oils, such as olive, canola, soybean, or sunflower oil.  Meal planning  · Eat a balanced diet that includes:  ? 5 or more servings of fruits and vegetables each day. At each meal, try to fill half of your plate with fruits and vegetables.  ? Up to 6-8 servings of whole grains each day.  ? Less than 6 oz of lean meat, poultry, or fish each day. A 3-oz serving of meat is about the same size as a deck of cards. One egg equals 1 oz.  ? 2 servings of low-fat dairy each day.  ? A serving of nuts, seeds, or beans 5 times each week.  ? Heart-healthy fats. Healthy fats called Omega-3 fatty acids are found in foods such as flaxseeds and coldwater fish, like sardines, salmon, and mackerel.  · Limit how much you eat of the following:  ? Canned or prepackaged foods.  ? Food that is high in trans fat, such as fried foods.  ? Food that is high in saturated fat, such as fatty meat.  ? Sweets, desserts, sugary drinks, and other foods with added sugar.  ? Full-fat dairy products.  · Do not salt foods before eating.  · Try to eat at least 2 vegetarian meals each week.  · Eat more home-cooked food and less restaurant, buffet, and fast food.  · When eating at a restaurant, ask that your food be prepared with less salt or no salt, if possible.  What foods are recommended?  The items listed may not be a complete list. Talk with your dietitian about what dietary choices are best for you.  Grains  Whole-grain or whole-wheat bread. Whole-grain or whole-wheat pasta. Brown rice. Oatmeal. Quinoa. Bulgur. Whole-grain and low-sodium cereals. Wendy bread. Low-fat, low-sodium crackers. Whole-wheat flour tortillas.  Vegetables  Fresh or frozen vegetables (raw, steamed, roasted, or grilled). Low-sodium or reduced-sodium tomato and vegetable juice. Low-sodium or reduced-sodium tomato sauce and tomato paste. Low-sodium or reduced-sodium canned vegetables.  Fruits  All fresh, dried, or frozen fruit. Canned fruit in natural juice (without  added sugar).  Meat and other protein foods  Skinless chicken or turkey. Ground chicken or turkey. Pork with fat trimmed off. Fish and seafood. Egg whites. Dried beans, peas, or lentils. Unsalted nuts, nut butters, and seeds. Unsalted canned beans. Lean cuts of beef with fat trimmed off. Low-sodium, lean deli meat.  Dairy  Low-fat (1%) or fat-free (skim) milk. Fat-free, low-fat, or reduced-fat cheeses. Nonfat, low-sodium ricotta or cottage cheese. Low-fat or nonfat yogurt. Low-fat, low-sodium cheese.  Fats and oils  Soft margarine without trans fats. Vegetable oil. Low-fat, reduced-fat, or light mayonnaise and salad dressings (reduced-sodium). Canola, safflower, olive, soybean, and sunflower oils. Avocado.  Seasoning and other foods  Herbs. Spices. Seasoning mixes without salt. Unsalted popcorn and pretzels. Fat-free sweets.  What foods are not recommended?  The items listed may not be a complete list. Talk with your dietitian about what dietary choices are best for you.  Grains  Baked goods made with fat, such as croissants, muffins, or some breads. Dry pasta or rice meal packs.  Vegetables  Creamed or fried vegetables. Vegetables in a cheese sauce. Regular canned vegetables (not low-sodium or reduced-sodium). Regular canned tomato sauce and paste (not low-sodium or reduced-sodium). Regular tomato and vegetable juice (not low-sodium or reduced-sodium). Pickles. Olives.  Fruits  Canned fruit in a light or heavy syrup. Fried fruit. Fruit in cream or butter sauce.  Meat and other protein foods  Fatty cuts of meat. Ribs. Fried meat. Mina. Sausage. Bologna and other processed lunch meats. Salami. Fatback. Hotdogs. Bratwurst. Salted nuts and seeds. Canned beans with added salt. Canned or smoked fish. Whole eggs or egg yolks. Chicken or turkey with skin.  Dairy  Whole or 2% milk, cream, and half-and-half. Whole or full-fat cream cheese. Whole-fat or sweetened yogurt. Full-fat cheese. Nondairy creamers. Whipped toppings.  Processed cheese and cheese spreads.  Fats and oils  Butter. Stick margarine. Lard. Shortening. Ghee. Mina fat. Tropical oils, such as coconut, palm kernel, or palm oil.  Seasoning and other foods  Salted popcorn and pretzels. Onion salt, garlic salt, seasoned salt, table salt, and sea salt. Worcestershire sauce. Tartar sauce. Barbecue sauce. Teriyaki sauce. Soy sauce, including reduced-sodium. Steak sauce. Canned and packaged gravies. Fish sauce. Oyster sauce. Cocktail sauce. Horseradish that you find on the shelf. Ketchup. Mustard. Meat flavorings and tenderizers. Bouillon cubes. Hot sauce and Tabasco sauce. Premade or packaged marinades. Premade or packaged taco seasonings. Relishes. Regular salad dressings.  Where to find more information:  · National [employer], [employer], [employer] [place]: www.nhlbi.nih.gov  · American Heart Association: www.heart.org  Summary  · The DASH eating plan is a healthy eating plan that has been shown to reduce high blood pressure (hypertension). It may also reduce your risk for type 2 diabetes, heart disease, and stroke.  · With the DASH eating plan, you should limit salt (sodium) intake to 2,300 mg a day. If you have hypertension, you may need to reduce your sodium intake to 1,500 mg a day.  · When on the DASH eating plan, aim to eat more fresh fruits and vegetables, whole grains, lean proteins, low-fat dairy, and heart-healthy fats.  · Work with your health care provider or diet and nutrition specialist (dietitian) to adjust your eating plan to your individual calorie needs.  This information is not intended to replace advice given to you by your health care provider. Make sure you discuss any questions you have with your health care provider.  Document Released: 12/06/2012 Document Revised: 12/11/2017 Document Reviewed: 12/11/2017  [employer] Interactive Patient Education © 2019 [employer]        Encouraged low salt diet, cont lasix as prescribed for swelling, compression stockings  and elevation.   Apply triamcinolone cream to affected areas bid for 7-14 days, avoid old detergent.   Stop amlodipine trial nifedipine 30 mg daily, monitor BP at home call with elevated readings >140/90  If symptoms persist 7 days call office   If worsening leg pain or swelling advised ER.   Increase fluid intake, get plenty of rest.   Patient agrees with plan of care and understands instructions. Call if worsening symptoms or any problems or concerns.

## 2019-08-07 NOTE — PROGRESS NOTES
"Subjective   Michael Lopez is a 58 y.o. male.     History of Present Illness   C/o left leg swelling, he was seen on 6/6/19 for sinusitis, given amoxicillin, states symptoms started about 6 weeks ago, states he had physical on 6/20/19 symptoms worsened a couple of days later, he was given lasix per sleep med, did not help, he is taking amlodipine 5mg daily, denies swelling in feet. Also noticed rash on bilat lower legs and abd. He denies changes in soap, lotion, detergent. States he did change to tide about 3 weeks ago, states rash on lower legs does not itch.   Also c/o leaky bowel, has colonoscopy scheduled for tomorrow, states he has leaking after BM, he states takes him a while to \"clean up\" after BM, he mares have hemorrhoids. He states diet is ok, improved, stopped soda and carbs.     The following portions of the patient's history were reviewed and updated as appropriate: allergies, current medications, past family history, past medical history, past social history, past surgical history and problem list.    Review of Systems   Constitutional: Negative for chills, diaphoresis and fever.   Respiratory: Negative for cough and shortness of breath.    Cardiovascular: Positive for leg swelling. Negative for chest pain.   Gastrointestinal: Negative for abdominal pain and diarrhea.   Musculoskeletal: Negative for arthralgias and myalgias.   Skin: Positive for rash.   Neurological: Negative for dizziness, light-headedness and headache.   All other systems reviewed and are negative.      Objective   Physical Exam   Constitutional: He is oriented to person, place, and time. He appears well-developed and well-nourished.   HENT:   Head: Normocephalic.   Eyes: Pupils are equal, round, and reactive to light.   Cardiovascular: Normal rate, regular rhythm, normal heart sounds and intact distal pulses.   Pulses:       Radial pulses are 2+ on the right side, and 2+ on the left side.        Dorsalis pedis pulses are 2+ on the right " side, and 2+ on the left side.        Posterior tibial pulses are 2+ on the right side, and 2+ on the left side.   Mild non pitting LE edema noted.    Pulmonary/Chest: Effort normal and breath sounds normal.   Musculoskeletal: Normal range of motion.   Neurological: He is alert and oriented to person, place, and time.   Skin: Skin is warm and dry. Rash noted. Rash is maculopapular.        Psychiatric: He has a normal mood and affect. His behavior is normal.   Nursing note and vitals reviewed.        Assessment/Plan   Michael was seen today for leg swelling and fecal incontinence.    Diagnoses and all orders for this visit:    Pedal edema    Dermatitis    Essential hypertension    Other orders  -     triamcinolone (KENALOG) 0.1 % ointment; Apply  topically to the appropriate area as directed 2 (Two) Times a Day.  -     NIFEdipine XL (PROCARDIA XL) 30 MG 24 hr tablet; Take 1 tablet by mouth Daily.          Encouraged low salt diet, cont lasix as prescribed for swelling, compression stockings and elevation.   Apply triamcinolone cream to affected areas bid for 7-14 days, avoid old detergent.   Stop amlodipine trial nifedipine 30 mg daily, monitor BP at home call with elevated readings >140/90  If symptoms persist 7 days call office   If worsening leg pain or swelling advised ER.   Increase fluid intake, get plenty of rest.   Patient agrees with plan of care and understands instructions. Call if worsening symptoms or any problems or concerns.

## 2019-08-08 ENCOUNTER — ANESTHESIA EVENT (OUTPATIENT)
Dept: GASTROENTEROLOGY | Facility: HOSPITAL | Age: 59
End: 2019-08-08

## 2019-08-08 ENCOUNTER — ANESTHESIA (OUTPATIENT)
Dept: GASTROENTEROLOGY | Facility: HOSPITAL | Age: 59
End: 2019-08-08

## 2019-08-08 ENCOUNTER — HOSPITAL ENCOUNTER (OUTPATIENT)
Facility: HOSPITAL | Age: 59
Setting detail: HOSPITAL OUTPATIENT SURGERY
Discharge: HOME | End: 2019-08-08
Attending: SURGERY | Admitting: SURGERY

## 2019-08-08 VITALS
TEMPERATURE: 98.2 F | DIASTOLIC BLOOD PRESSURE: 85 MMHG | BODY MASS INDEX: 42.66 KG/M2 | OXYGEN SATURATION: 96 % | HEART RATE: 73 BPM | WEIGHT: 315 LBS | HEIGHT: 72 IN | RESPIRATION RATE: 16 BRPM | SYSTOLIC BLOOD PRESSURE: 140 MMHG

## 2019-08-08 PROCEDURE — S0260 H&P FOR SURGERY: HCPCS | Performed by: SURGERY

## 2019-08-08 PROCEDURE — 45378 DIAGNOSTIC COLONOSCOPY: CPT | Performed by: SURGERY

## 2019-08-08 PROCEDURE — 43235 EGD DIAGNOSTIC BRUSH WASH: CPT | Performed by: SURGERY

## 2019-08-08 PROCEDURE — 25010000002 PROPOFOL 10 MG/ML EMULSION: Performed by: NURSE ANESTHETIST, CERTIFIED REGISTERED

## 2019-08-08 RX ORDER — SODIUM CHLORIDE 0.9 % (FLUSH) 0.9 %
3 SYRINGE (ML) INJECTION AS NEEDED
Status: DISCONTINUED | OUTPATIENT
Start: 2019-08-08 | End: 2019-08-08 | Stop reason: HOSPADM

## 2019-08-08 RX ORDER — PROPOFOL 10 MG/ML
VIAL (ML) INTRAVENOUS AS NEEDED
Status: DISCONTINUED | OUTPATIENT
Start: 2019-08-08 | End: 2019-08-08 | Stop reason: SURG

## 2019-08-08 RX ORDER — LIDOCAINE HYDROCHLORIDE 10 MG/ML
0.5 INJECTION, SOLUTION INFILTRATION; PERINEURAL ONCE AS NEEDED
Status: DISCONTINUED | OUTPATIENT
Start: 2019-08-08 | End: 2019-08-08 | Stop reason: HOSPADM

## 2019-08-08 RX ORDER — PROMETHAZINE HYDROCHLORIDE 25 MG/1
25 SUPPOSITORY RECTAL ONCE AS NEEDED
Status: DISCONTINUED | OUTPATIENT
Start: 2019-08-08 | End: 2019-08-08 | Stop reason: HOSPADM

## 2019-08-08 RX ORDER — PROMETHAZINE HYDROCHLORIDE 25 MG/ML
12.5 INJECTION, SOLUTION INTRAMUSCULAR; INTRAVENOUS ONCE AS NEEDED
Status: DISCONTINUED | OUTPATIENT
Start: 2019-08-08 | End: 2019-08-08 | Stop reason: HOSPADM

## 2019-08-08 RX ORDER — LIDOCAINE HYDROCHLORIDE 20 MG/ML
INJECTION, SOLUTION INFILTRATION; PERINEURAL AS NEEDED
Status: DISCONTINUED | OUTPATIENT
Start: 2019-08-08 | End: 2019-08-08 | Stop reason: SURG

## 2019-08-08 RX ORDER — PROPOFOL 10 MG/ML
VIAL (ML) INTRAVENOUS CONTINUOUS PRN
Status: DISCONTINUED | OUTPATIENT
Start: 2019-08-08 | End: 2019-08-08 | Stop reason: SURG

## 2019-08-08 RX ORDER — SODIUM CHLORIDE, SODIUM LACTATE, POTASSIUM CHLORIDE, CALCIUM CHLORIDE 600; 310; 30; 20 MG/100ML; MG/100ML; MG/100ML; MG/100ML
1000 INJECTION, SOLUTION INTRAVENOUS CONTINUOUS
Status: DISCONTINUED | OUTPATIENT
Start: 2019-08-08 | End: 2019-08-08 | Stop reason: HOSPADM

## 2019-08-08 RX ORDER — PROMETHAZINE HYDROCHLORIDE 25 MG/1
25 TABLET ORAL ONCE AS NEEDED
Status: DISCONTINUED | OUTPATIENT
Start: 2019-08-08 | End: 2019-08-08 | Stop reason: HOSPADM

## 2019-08-08 RX ORDER — AMLODIPINE BESYLATE 5 MG/1
5 TABLET ORAL DAILY
COMMUNITY
End: 2019-10-01 | Stop reason: ALTCHOICE

## 2019-08-08 RX ADMIN — SODIUM CHLORIDE, POTASSIUM CHLORIDE, SODIUM LACTATE AND CALCIUM CHLORIDE 1000 ML: 600; 310; 30; 20 INJECTION, SOLUTION INTRAVENOUS at 08:27

## 2019-08-08 RX ADMIN — PROPOFOL 200 MCG/KG/MIN: 10 INJECTION, EMULSION INTRAVENOUS at 09:08

## 2019-08-08 RX ADMIN — PROPOFOL 250 MG: 10 INJECTION, EMULSION INTRAVENOUS at 09:07

## 2019-08-08 RX ADMIN — LIDOCAINE HYDROCHLORIDE 60 MG: 20 INJECTION, SOLUTION INFILTRATION; PERINEURAL at 09:07

## 2019-08-08 NOTE — ANESTHESIA PREPROCEDURE EVALUATION
Anesthesia Evaluation     Patient summary reviewed and Nursing notes reviewed   NPO Solid Status: > 8 hours  NPO Liquid Status: > 8 hours           Airway   Mallampati: IV  TM distance: >3 FB  Neck ROM: full  Large neck circumference, Small opening and Narrow palate  Dental - normal exam     Pulmonary - normal exam   (+) sleep apnea on CPAP,   Cardiovascular - normal exam    (+) hypertension less than 2 medications,       Neuro/Psych  GI/Hepatic/Renal/Endo    (+) morbid obesity,      Musculoskeletal     (+) back pain,   Abdominal    Substance History      OB/GYN          Other                        Anesthesia Plan    ASA 3     MAC     Anesthetic plan, all risks, benefits, and alternatives have been provided, discussed and informed consent has been obtained with: patient and spouse/significant other.

## 2019-08-08 NOTE — ANESTHESIA POSTPROCEDURE EVALUATION
"Patient: Michael Lopez    Procedure Summary     Date:  08/08/19 Room / Location:  [facility] ENDOSCOPY 5 /  [facility]    Anesthesia Start:  0900 Anesthesia Stop:  0937    Procedures:       COLONOSCOPY INTO CECUM (N/A )      ESOPHAGOGASTRODUODENOSCOPY (N/A Esophagus) Diagnosis:       Oropharyngeal dysphagia      (Oropharyngeal dysphagia [R13.12])    Surgeon:  Genaro Blackwell MD Provider:  Patti Gage MD    Anesthesia Type:  general ASA Status:  3          Anesthesia Type: general  Last vitals  BP   140/85 (08/08/19 0955)   Temp   36.8 °C (98.2 °F) (08/08/19 0945)   Pulse   73 (08/08/19 0955)   Resp   16 (08/08/19 0814)     SpO2   96 % (08/08/19 0955)     Post Anesthesia Care and Evaluation    Patient location during evaluation: PACU  Patient participation: complete - patient participated  Level of consciousness: awake  Pain score: 0  Pain management: adequate  Airway patency: patent  Anesthetic complications: No anesthetic complications    Cardiovascular status: acceptable  Respiratory status: acceptable  Hydration status: acceptable    Comments: Blood pressure 140/85, pulse 73, temperature 36.8 °C (98.2 °F), temperature source Oral, resp. rate 16, height 182.9 cm (72\"), weight (!) 156 kg (344 lb), SpO2 96 %.    No anesthesia care post op    "

## 2019-08-08 NOTE — H&P
Cc: Dysphagia     History of presenting illness:   This is a obese 58-year-old gentleman who describes a long-standing history of reflux disease.  He last had both an upper and lower endoscopy done in 2006.  Colon was normal and EGD showed mild Schatzki ring formation which was dilated with a 56 Estrada dilator with minimal resistance.  He says that over the past couple of months he describes more difficulty eating both solids and liquids and describes some gagging which sounds oral pharyngeal in nature.  There is not been any associated weight loss.  He was briefly started on a proton pump inhibitor, but stopped quickly because of some allergic reactions, which sounds like they were not related to his medications.     Past Medical History: hypertension, GERD, arthritis, obesity, sleep apnea, back pain, cor pulmonale     Past Surgical History: Significant for remote right inguinal hernia repair, colonoscopy in 2006     Medications: Tylenol, Norvasc, melatonin, Aleve, testosterone replacement     Allergies: None known     Social History: Patient is a non-smoker.  He admits to using alcohol once or twice a week.     Family History: Negative for colorectal cancer     Review of Systems:  Constitutional: Negative for weakness, change in activity or fever  Neck: no swollen glands  or odynophagia  Respiratory: negative for SOB, cough, hemoptysis or wheezing  Cardiovascular: negative for chest pain, palpitations or peripheral edema  Gastrointestinal: Positive for dysphasia and reflux        Physical Exam:   Body mass index 48.3  Discussed with patient increased perioperative risks associated with obesity including increased risks of DVT, infection, seromas, poor wound healing and hernias (with abdominal surgery).     General: alert and oriented, appropriate, no acute distress  Neck: Supple without lymphadenopathy or thyromegaly, trachea is in the midline  Respiratory: Lungs are clear bilaterally without wheezing, no use of  accessory muscles is noted  Cardiovascular: Regular rate and rhythm without murmur, no peripheral edema  Gastrointestinal: Obese, soft, no mass, no hernia, nontender     Laboratory data: None     Imaging data: None     Previous EGD and colonoscopy report from 2006 done by Dr. Kruse is reviewed by me.  The patient's colon was described as normal at that time.  Mild Schatzki ring dilated to 56 Tamazight with minimal resistance at that time.        Assessment and plan:   -Dysphagia, oral pharyngeal phase, recommend upper endoscopy  -Acid reflux  -Evaluation for screening colonoscopy, recommend concomitant upper and lower endoscopy.  Risks including anesthetic risks, cardio pulmonary risks, bleeding and perforation discussed.  Patient is agreeable to proceed.        Genaro Blackwell MD, FACS  General, Minimally Invasive and Endoscopic Surgery  [facility]     [address], [address]  [place], [place], [address]  P: [phone]  F: [phone]

## 2019-08-08 NOTE — ANESTHESIA PROCEDURE NOTES
Airway  Urgency: elective    Airway not difficult    General Information and Staff    Patient location during procedure: OR (Endo)  Anesthesiologist: Patti Gage MD  CRNA: Roxy Roa CRNA    Indications and Patient Condition  Indications for airway management: airway protection    Preoxygenated: yes  MILS not maintained throughout  Mask difficulty assessment: 0 - not attempted    Final Airway Details  Final airway type: supraglottic airway      Successful airway: LMA (gastro)  Size 4    Number of attempts at approach: 1

## 2019-08-08 NOTE — OP NOTE
Operative Note :   Genaro Blackwell MD    Michael Lopez  1960    Procedure Date: 08/08/19    Pre-op Diagnosis:  · Dysphasia  · Screening:    Post-op Diagnosis:  · Normal upper endoscopy  · Diverticulosis    Procedure:   · Esophagogastroduodenoscopy  · Colonoscopy to cecum    Surgeon: Genaro Blackwell MD      Anesthesia: MAC    Indications:  · 58-year-old obese gentleman who describes some occasional dysphasia.  It sounds oral pharyngeal phase, but he does have a history of prior esophageal dilatation done around 12 years ago with a bougie dilator.  Last colonoscopy in 2006, normal at that time.    Findings:   · Normal upper endoscopy  · Specifically, no stricture of the esophagus identified  · Moderate diverticulosis of the sigmoid colon    Recommendations:   · High-fiber diet  · Repeat colonoscopy in 10 years on a screening basis  · Consider speech therapy evaluation if symptoms of dysphagia persist    Description of procedure:    After obtaining informed consent, patient was brought to the endoscopy suite and was sedated.  The flexible gastroscope was introduced through the patient's mouth and then past the cricopharyngeus into the esophagus and through the GE junction and into the stomach.  Scope was then passed through the pylorus and into the duodenum.  The junction of the third and fourth portion was reached.  The duodenum appeared normal.  The major papilla, duodenal bulb and pyloric channel were normal.  The scope was pulled back into the stomach.  There was no gastritis.  Retroflexion of the scope did not demonstrate any significant hiatus hernia.  The scope was then pulled back to the level of the GE junction.  This was completely normal and was not strictured.  There was no narrowing and was probably twice the width of the gastroscope.  The remainder of the esophagus was normal on withdrawal of the scope.  Scope was removed completely.  Digital rectal examination was then undertaken and was normal.  The  tone may have been slightly decreased.  The colonoscope was then inserted to the rectum and passed around with minimal difficulty to the level of the cecum under direct visualization.  Scope was then slowly withdrawn, carefully visualizing all mucosal surfaces.  The cecum, ascending colon, hepatic flexure, transverse colon, splenic flexure and descending colon were normal.  In the sigmoid colon there were multiple widemouth diverticula without evidence of inflammation or stricture.  The rectum was normal.  The scope was retroflexed in the rectum and this was normal.  No significant hemorrhoidal disease.  The scope was withdrawn completely.  Patient tolerated this well.      Genaro Blackwell MD  General and Endoscopic Surgery  [facility]    [address], [address]  [place], [address]  P: [phone]  F: [phone]

## 2019-10-01 ENCOUNTER — OFFICE VISIT (OUTPATIENT)
Dept: FAMILY MEDICINE CLINIC | Facility: CLINIC | Age: 59
End: 2019-10-01

## 2019-10-01 VITALS
DIASTOLIC BLOOD PRESSURE: 82 MMHG | HEIGHT: 72 IN | BODY MASS INDEX: 42.66 KG/M2 | WEIGHT: 315 LBS | TEMPERATURE: 97.8 F | HEART RATE: 81 BPM | SYSTOLIC BLOOD PRESSURE: 140 MMHG | OXYGEN SATURATION: 95 %

## 2019-10-01 DIAGNOSIS — M25.512 ACUTE PAIN OF LEFT SHOULDER: ICD-10-CM

## 2019-10-01 DIAGNOSIS — M54.2 NECK PAIN: ICD-10-CM

## 2019-10-01 DIAGNOSIS — M54.40 LOW BACK PAIN WITH SCIATICA, SCIATICA LATERALITY UNSPECIFIED, UNSPECIFIED BACK PAIN LATERALITY, UNSPECIFIED CHRONICITY: Primary | ICD-10-CM

## 2019-10-01 PROCEDURE — 72100 X-RAY EXAM L-S SPINE 2/3 VWS: CPT | Performed by: INTERNAL MEDICINE

## 2019-10-01 PROCEDURE — 99214 OFFICE O/P EST MOD 30 MIN: CPT | Performed by: INTERNAL MEDICINE

## 2019-10-01 PROCEDURE — 72050 X-RAY EXAM NECK SPINE 4/5VWS: CPT | Performed by: INTERNAL MEDICINE

## 2019-10-01 PROCEDURE — 73030 X-RAY EXAM OF SHOULDER: CPT | Performed by: INTERNAL MEDICINE

## 2019-10-01 RX ORDER — PREDNISONE 10 MG/1
TABLET ORAL DAILY
Qty: 21 TABLET | Refills: 0 | Status: SHIPPED | OUTPATIENT
Start: 2019-10-01 | End: 2021-03-08

## 2019-10-01 NOTE — PROGRESS NOTES
Subjective   Michael Lopez is a 59 y.o. male.   Patient was carrying heavy boards on shoulder stepped into hole with fall to the side.  Complains of pain in the left shoulder neck as well as lower back with sciatica down left leg.  History of Present Illness   Recent fall as above carrying boards on shoulder stepped in hole which caused a fall no pain in the ankle knee area but is having sciatica type complaints and lower back pain history of lumbar issues in the past also is having pain with shoulder pain with movement somewhat restricted range of motion and pain in neck more posterior left side of neck as well.  No reported troubles with urinating or bowel movements.  Patient is having troubles with movement due to the discomfort.  Meaning lower back.  Allergies are none.  Non-smoker.  Family history positive for arthritis heart disease: Polyps and diverticulitis and 2 cancers undefined.  Review of Systems   Musculoskeletal: Positive for back pain.   All other systems reviewed and are negative.      Objective   Vitals:    10/01/19 1439   BP: 140/82   Pulse: 81   Temp: 97.8 °F (36.6 °C)   SpO2: 95%   Weight: (!) 159 kg (350 lb 6.4 oz)     Physical Exam   Constitutional: He appears well-developed and well-nourished.   HENT:   Head: Normocephalic and atraumatic.   Eyes: Conjunctivae are normal. Pupils are equal, round, and reactive to light.   Cardiovascular: Normal rate, regular rhythm and normal heart sounds.   Pulmonary/Chest: Effort normal and breath sounds normal.   Musculoskeletal:   Regarding left shoulder limited her fair range of motion left shoulder.  Is able to abduct to 110 degrees reasonably comfortably pushing it I can get him to 160 he says again higher but that is not obvious.  Also has decreased external range of motion left shoulder.  Mild discomfort with passive range of motion as well.  No significant pain on palpation anterior shoulder    Regarding neck nontender low complaints pain mild pain  posterior laterally low to the left.  Able to turn her neck 60 degrees left right without overt discomfort able to tilt head 45 degrees to the left and right as well.    Regarding lower back somewhat limited flexion getting 50 degrees forward, 10 degrees backwards, 20 degrees left right lateral flexion all with discomfort.  Mild pain to palpation lower lumbar spine at bedside and more so to the left.   Neurological: He is alert.   Patient with positive straight leg raises bilaterally positive on the right at 55 degrees.  Positive on the left at 45 degrees left being worse than right.  Knee jerks are trace bilaterally.    Upper extremity reflexes reflexes 1+ bilaterally.   Skin: Skin is warm and dry.   Nursing note and vitals reviewed.      No results found for: INR    Procedures  C-spine x-rays 4 views obtained.  No comparison available.  Loss of lordotic curve.  Narrowing at C5-C6.  I am not able to see below that due to interference with shoulders.    X-ray left shoulder 2 views obtained.  No acute changes noted no comparison available no bony or soft tissue normalities.    X-ray LS spine PA and lateral views obtained.  Significant narrowing at L5-S1 mild narrowing at L L4-L5, narrowing at L3-L4 as well.  Mild degree of scoliosis no comparison available.  Assessment/Plan   1.  Lumbar pain with sciatica in the left leg plan prednisone 60-10 patient have not much improved 1 weeks times call for consideration for MRI lumbar spine work note for 2 weeks.  With follow-up in 2 weeks time as well.    2.  Left shoulder pain with reduced range of motion concern for rotator cuff tendinitis versus tear initiate orthopedic referral.    3.  Cervical pain plan observation for now if pain continues we will discuss further on follow-up in 2 weeks time.    Much of this encounter note is an electronic transcription/translation of spoken language to printed text.  The electronic translation of spoken language may permit erroneous, or  at times, nonsensical words or phrases to be inadvertently transcribed.  Although I have reviewed the note for such errors, some may still exist. If there are questions or for further clarification, please contact me.    pred 60-10  Ref ortho rihadson    u 2wks

## 2019-10-09 ENCOUNTER — TELEPHONE (OUTPATIENT)
Dept: FAMILY MEDICINE CLINIC | Facility: CLINIC | Age: 59
End: 2019-10-09

## 2019-10-16 PROBLEM — M54.2 CERVICAL PAIN: Status: ACTIVE | Noted: 2019-10-16

## 2019-10-16 PROBLEM — M25.519 ACUTE SHOULDER PAIN: Status: ACTIVE | Noted: 2019-10-16

## 2019-10-29 ENCOUNTER — OFFICE VISIT (OUTPATIENT)
Dept: SLEEP MEDICINE | Facility: HOSPITAL | Age: 59
End: 2019-10-29

## 2019-10-29 VITALS
HEART RATE: 71 BPM | WEIGHT: 315 LBS | BODY MASS INDEX: 42.66 KG/M2 | DIASTOLIC BLOOD PRESSURE: 79 MMHG | SYSTOLIC BLOOD PRESSURE: 126 MMHG | HEIGHT: 72 IN

## 2019-10-29 DIAGNOSIS — I27.81 COR PULMONALE, CHRONIC (HCC): ICD-10-CM

## 2019-10-29 DIAGNOSIS — G47.33 OBSTRUCTIVE SLEEP APNEA, ADULT: Primary | ICD-10-CM

## 2019-10-29 DIAGNOSIS — I10 ESSENTIAL HYPERTENSION: ICD-10-CM

## 2019-10-29 PROCEDURE — G0463 HOSPITAL OUTPT CLINIC VISIT: HCPCS

## 2019-10-29 NOTE — PROGRESS NOTES
"PULMONARY SLEEP CONSULT NOTE      PATIENT IDENTIFICATION:  Name: Michael Lopez  Age: 59 y.o.  Sex: male  :  1960  MRN: [id number]    DATE OF CONSULTATION:  10/29/2019   Referring Physician: No admitting provider for patient encounter.                  CHIEF COMPLAINT: Obstructive Sleep Apnea    History of Present Illness:   Michael Lopez is a 59 y.o. male  Pt on CPAP feeling better more energy especially the night he use it more than 4 hours, no sleepiness no fatigue no tiredness, no mask irritation no dryness, compliance report reviewed with pt AHI< 5 with good usage.       Review of Systems:   Constitutional: negative   Eyes: negative   ENT/oropharynx: negative   Cardiovascular: negative   Respiratory: negative   Gastrointestinal: negative   Genitourinary: negative   Neurological: negative   Musculoskeletal: negative   Integument/breast: negative   Endocrine: negative   Allergic/Immunologic: negative     Past Medical History:  Past Medical History:   Diagnosis Date   • Arthritis    • Back pain    • GERD (gastroesophageal reflux disease)    • Hernia, inguinal    • Hypertension        Past Surgical History:  Past Surgical History:   Procedure Laterality Date   • ABDOMINAL HERNIA REPAIR N/A    • COLONOSCOPY N/A    • COLONOSCOPY N/A 2019    Procedure: COLONOSCOPY INTO CECUM;  Surgeon: Genaro Blackwell MD;  Location: [facility] ENDOSCOPY;  Service: General   • ENDOSCOPY N/A 2019    Procedure: ESOPHAGOGASTRODUODENOSCOPY;  Surgeon: Genaro Blackwell MD;  Location: [facility] ENDOSCOPY;  Service: General   • EYE SURGERY      as a child for \"cross sided\"        Family History:  Family History   Problem Relation Age of Onset   • Diverticulitis Mother    • Arthritis Mother    • Heart disease Father    • Colon polyps Father    • No Known Problems Sister    • Cancer Maternal Grandmother         cancer hx in their 90's   • Cancer Paternal Grandfather         cancer hx in their 90's        Social History: "   Social History     Tobacco Use   • Smoking status: Never Smoker   • Smokeless tobacco: Never Used   Substance Use Topics   • Alcohol use: Yes     Alcohol/week: 0.6 - 1.2 oz     Types: 1 - 2 Standard drinks or equivalent per week     Comment: OCCASIONAL        Allergies:  No Known Allergies    Home Meds:    (Not in a hospital admission)    Objective:    Vitals Ranges:   Heart Rate:  [71] 71  BP: (126)/(79) 126/79  Body mass index is 47.06 kg/m².     Exam:  General Appearance:  WDWN    HEENT:   without obvious abnormality,  Conjunctiva/corneas clear,  Normal external ear canals, no drainage    Clear orsalmucosa,  Mallampati score 3    Neck:  Supple, symmetrical, trachea midline. No JVD.  Lungs:   Bilateral basal rhonchi bilaterally, respirations unlabored symmetrical wall movement.    Chest wall:  No tenderness or deformity.    Heart:  Regular rate and rhythm, S1 and S2 normal.  Extremities: ++ edema no clubbing or Cyanosis        Data Review:  All labs (24hrs): No results found for this or any previous visit (from the past 24 hour(s)).     Imaging:  [unfilled]    ASSESSMENT:  Diagnoses and all orders for this visit:    Obstructive sleep apnea, adult    Cor pulmonale, chronic (CMS/HCC)    Essential hypertension        PLAN:  This patient with obstructive sleep apnea, compliance is improved. Encourage to use it more frequent, I re-emphasized on pt the long and short term benefit of treating NATALIYA.     Treating NATALIYA will improve BP control and corpulmonalle symptoms continue diuretics         Corey Parish MD. D, ABSM.  10/29/2019  12:07 PM

## 2019-11-07 ENCOUNTER — TRANSCRIBE ORDERS (OUTPATIENT)
Dept: ADMINISTRATIVE | Facility: HOSPITAL | Age: 59
End: 2019-11-07

## 2019-11-07 DIAGNOSIS — N63.0 BREAST LUMP: Primary | ICD-10-CM

## 2019-11-13 ENCOUNTER — HOSPITAL ENCOUNTER (OUTPATIENT)
Dept: MAMMOGRAPHY | Facility: HOSPITAL | Age: 59
Discharge: HOME | End: 2019-11-13
Admitting: FAMILY MEDICINE

## 2019-11-13 ENCOUNTER — HOSPITAL ENCOUNTER (OUTPATIENT)
Dept: ULTRASOUND IMAGING | Facility: HOSPITAL | Age: 59
Discharge: HOME | End: 2019-11-13

## 2019-11-13 DIAGNOSIS — N63.0 BREAST LUMP: ICD-10-CM

## 2019-11-13 PROCEDURE — 77066 DX MAMMO INCL CAD BI: CPT

## 2019-11-13 PROCEDURE — 76642 ULTRASOUND BREAST LIMITED: CPT

## 2019-11-13 PROCEDURE — G0279 TOMOSYNTHESIS, MAMMO: HCPCS

## 2019-12-23 RX ORDER — NIFEDIPINE 30 MG/1
TABLET, EXTENDED RELEASE ORAL
Qty: 30 TABLET | Refills: 2 | Status: SHIPPED | OUTPATIENT
Start: 2019-12-23 | End: 2020-07-06

## 2020-02-25 ENCOUNTER — TRANSCRIBE ORDERS (OUTPATIENT)
Dept: ADMINISTRATIVE | Facility: HOSPITAL | Age: 60
End: 2020-02-25

## 2020-02-25 ENCOUNTER — HOSPITAL ENCOUNTER (OUTPATIENT)
Dept: GENERAL RADIOLOGY | Facility: HOSPITAL | Age: 60
Discharge: HOME | End: 2020-02-25
Admitting: FAMILY MEDICINE

## 2020-02-25 DIAGNOSIS — J20.9 ACUTE BRONCHITIS DUE TO INFECTION: Primary | ICD-10-CM

## 2020-02-25 DIAGNOSIS — J20.9 ACUTE BRONCHITIS DUE TO INFECTION: ICD-10-CM

## 2020-02-25 PROCEDURE — 71046 X-RAY EXAM CHEST 2 VIEWS: CPT

## 2020-06-02 ENCOUNTER — TRANSCRIBE ORDERS (OUTPATIENT)
Dept: ADMINISTRATIVE | Facility: HOSPITAL | Age: 60
End: 2020-06-02

## 2020-06-02 DIAGNOSIS — N50.9 SCROTAL LESION: Primary | ICD-10-CM

## 2020-06-05 ENCOUNTER — HOSPITAL ENCOUNTER (OUTPATIENT)
Dept: ULTRASOUND IMAGING | Facility: HOSPITAL | Age: 60
Discharge: HOME | End: 2020-06-05
Admitting: NURSE PRACTITIONER

## 2020-06-05 DIAGNOSIS — N50.9 SCROTAL LESION: ICD-10-CM

## 2020-06-05 PROCEDURE — 93976 VASCULAR STUDY: CPT

## 2020-06-05 PROCEDURE — 76870 US EXAM SCROTUM: CPT

## 2020-07-06 RX ORDER — NIFEDIPINE 30 MG/1
TABLET, EXTENDED RELEASE ORAL
Qty: 30 TABLET | Refills: 1 | Status: SHIPPED | OUTPATIENT
Start: 2020-07-06 | End: 2020-07-07

## 2020-07-07 RX ORDER — NIFEDIPINE 30 MG/1
TABLET, EXTENDED RELEASE ORAL
Qty: 30 TABLET | Refills: 2 | Status: SHIPPED | OUTPATIENT
Start: 2020-07-07 | End: 2021-01-25 | Stop reason: SDUPTHER

## 2020-10-08 ENCOUNTER — APPOINTMENT (OUTPATIENT)
Dept: CT IMAGING | Facility: HOSPITAL | Age: 60
End: 2020-10-08

## 2020-10-08 ENCOUNTER — HOSPITAL ENCOUNTER (EMERGENCY)
Facility: HOSPITAL | Age: 60
Discharge: HOME | End: 2020-10-08
Attending: EMERGENCY MEDICINE | Admitting: EMERGENCY MEDICINE

## 2020-10-08 VITALS
TEMPERATURE: 98.1 F | HEIGHT: 72 IN | DIASTOLIC BLOOD PRESSURE: 84 MMHG | HEART RATE: 79 BPM | SYSTOLIC BLOOD PRESSURE: 150 MMHG | BODY MASS INDEX: 42.66 KG/M2 | RESPIRATION RATE: 20 BRPM | WEIGHT: 315 LBS | OXYGEN SATURATION: 97 %

## 2020-10-08 DIAGNOSIS — G44.309 POST-CONCUSSION HEADACHE: Primary | ICD-10-CM

## 2020-10-08 PROCEDURE — 99284 EMERGENCY DEPT VISIT MOD MDM: CPT | Performed by: EMERGENCY MEDICINE

## 2020-10-08 PROCEDURE — 70450 CT HEAD/BRAIN W/O DYE: CPT

## 2020-10-08 PROCEDURE — 99282 EMERGENCY DEPT VISIT SF MDM: CPT

## 2020-10-08 RX ORDER — HYDROCODONE BITARTRATE AND ACETAMINOPHEN 5; 325 MG/1; MG/1
1 TABLET ORAL EVERY 8 HOURS PRN
Qty: 9 TABLET | Refills: 0 | Status: SHIPPED | OUTPATIENT
Start: 2020-10-08 | End: 2020-10-11

## 2020-10-08 NOTE — DISCHARGE INSTRUCTIONS
Rest as needed.  Please return to the ER for any worsening pain, fevers, weakness, dizziness, nausea, vomiting, vision changes or any other concerns.

## 2020-10-08 NOTE — ED NOTES
"Pt went to [facility] and was told to come here for head CT  Pt \"hit my head on staircase x2 days ago\"  Lost balance and has been dizzy since  HA is an 8/10 pain scale  Ringing in ears began as of 15 minutes       Hannah Jackman  10/08/20 1124    "

## 2020-10-08 NOTE — ED PROVIDER NOTES
"Subjective   History of Present Illness  History of Present Illness    Chief complaint: Head injury, headache, dizziness and nausea    Location: Top of head    Quality/Severity: Moderate aching pain    Timing/Duration: Injury occurred 2 days ago.  Pain ever since then    Modifying Factors: None    Narrative: This patient presents for evaluation of a persistent headache problem after he struck his head 2 days ago on some stairs.  He was apparently working at a house when he leaned forward and struck the top of his head against the edge of the stair.  He said he instantly felt \"woozy and lightheaded,\" but he did not pass out.  He tried to rest for little bit.  He was able to drive himself home but while he was driving he felt like he was weaving in and out of the abdirahman.  Since that time he has had persistent headache with nausea and vomiting and some tingling in his ears today.  He has tried some over-the-counter medications such as Tylenol and ibuprofen but they do not seem to be helping his symptoms so far.    Associated Symptoms: As above    Review of Systems   Constitutional: Negative for activity change and fever.   HENT: Negative for facial swelling and nosebleeds.         Ringing in ears   Eyes: Negative for pain and visual disturbance.   Respiratory: Negative for cough and shortness of breath.    Cardiovascular: Negative for chest pain.   Gastrointestinal: Positive for nausea. Negative for abdominal pain.   Skin: Negative for color change and rash.   Neurological: Positive for dizziness, light-headedness and headaches. Negative for tremors, seizures, syncope and facial asymmetry.   All other systems reviewed and are negative.      Past Medical History:   Diagnosis Date   • Arthritis    • Back pain    • GERD (gastroesophageal reflux disease)    • Hernia, inguinal    • Hypertension        No Known Allergies    Past Surgical History:   Procedure Laterality Date   • ABDOMINAL HERNIA REPAIR N/A 2001   • COLONOSCOPY " "N/A 2006   • COLONOSCOPY N/A 8/8/2019    Procedure: COLONOSCOPY INTO CECUM;  Surgeon: Genaro Blackwell MD;  Location: [facility] ENDOSCOPY;  Service: General   • ENDOSCOPY N/A 8/8/2019    Procedure: ESOPHAGOGASTRODUODENOSCOPY;  Surgeon: Genaro Blackwell MD;  Location: [facility] ENDOSCOPY;  Service: General   • EYE SURGERY      as a child for \"cross sided\"       Family History   Problem Relation Age of Onset   • Diverticulitis Mother    • Arthritis Mother    • Heart disease Father    • Colon polyps Father    • No Known Problems Sister    • Cancer Maternal Grandmother         cancer hx in their 90's   • Cancer Paternal Grandfather         cancer hx in their 90's       Social History     Socioeconomic History   • Marital status:      Spouse name: Not on file   • Number of children: Not on file   • Years of education: Not on file   • Highest education level: Not on file   Tobacco Use   • Smoking status: Never Smoker   • Smokeless tobacco: Never Used   Substance and Sexual Activity   • Alcohol use: Yes     Alcohol/week: 1.0 - 2.0 standard drinks     Types: 1 - 2 Standard drinks or equivalent per week     Comment: OCCASIONAL   • Drug use: No   • Sexual activity: Defer     ED Triage Vitals [10/08/20 1120]   Temp Heart Rate Resp BP SpO2   98.1 °F (36.7 °C) 79 20 150/84 97 %      Temp src Heart Rate Source Patient Position BP Location FiO2 (%)   -- -- -- -- --     Objective   Physical Exam  Vitals signs and nursing note reviewed.   Constitutional:       General: He is not in acute distress.     Appearance: He is well-developed. He is not ill-appearing, toxic-appearing or diaphoretic.   HENT:      Head: Normocephalic and atraumatic.      Comments: No external sign of scalp hematoma or laceration evident anywhere.  Mild tenderness notable to the crown of the head but no bruising evident.  Eyes:      General:         Right eye: No discharge.         Left eye: No discharge.      Pupils: Pupils are equal, round, and reactive " "to light.   Neck:      Musculoskeletal: Normal range of motion and neck supple.   Cardiovascular:      Rate and Rhythm: Normal rate and regular rhythm.      Pulses: Normal pulses.   Pulmonary:      Effort: Pulmonary effort is normal. No respiratory distress.   Musculoskeletal: Normal range of motion.         General: No deformity.   Skin:     General: Skin is warm and dry.      Findings: No erythema or rash.   Neurological:      Mental Status: He is alert and oriented to person, place, and time.   Psychiatric:         Behavior: Behavior normal.         Thought Content: Thought content normal.         Judgment: Judgment normal.       RADIOLOGY        Study: CT head without contrast    Findings: Normal, negative CT    Interpreted contemporaneously with treatment by Dr. Jackman, independently viewed by me    Procedures           ED Course  ED Course as of Oct 08 1251   Thu Oct 08, 2020   1244 I reviewed the head CT from today's visit.  It is quite reassuring for no intracranial injury.  Patient's physical exam and historical presentation is most consistent with a postconcussive headache syndrome.  I explained this diagnosis to the patient at bedside.  I advised him to rest and to avoid any high risk activities which may result in a repeat an injury.  I encouraged him to follow-up with his primary care doctor.  He will be discharged home in good condition with usual \"return to ER\" instructions for any worsening signs or symptoms.    [MISSY]      ED Course User Index  [MISSY] Denis Lopez MD                                           MDM  Number of Diagnoses or Management Options  Post-concussion headache:      Amount and/or Complexity of Data Reviewed  Tests in the radiology section of CPT®: reviewed and ordered  Independent visualization of images, tracings, or specimens: yes    Risk of Complications, Morbidity, and/or Mortality  Presenting problems: moderate  Diagnostic procedures: moderate  Management options: " moderate        Final diagnoses:   Post-concussion headache            Denis Lopez MD  10/08/20 4932

## 2020-10-21 ENCOUNTER — TRANSCRIBE ORDERS (OUTPATIENT)
Dept: ADMINISTRATIVE | Facility: HOSPITAL | Age: 60
End: 2020-10-21
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
## 2020-10-28 ENCOUNTER — HOSPITAL ENCOUNTER (OUTPATIENT)
Dept: MRI IMAGING | Facility: HOSPITAL | Age: 60
Discharge: HOME | End: 2020-10-28
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
## 2020-11-03 ENCOUNTER — OFFICE VISIT (OUTPATIENT)
Dept: SLEEP MEDICINE | Facility: HOSPITAL | Age: 60
End: 2020-11-03

## 2020-11-03 ENCOUNTER — APPOINTMENT (OUTPATIENT)
Dept: SLEEP MEDICINE | Facility: HOSPITAL | Age: 60
End: 2020-11-03

## 2020-11-03 VITALS
SYSTOLIC BLOOD PRESSURE: 139 MMHG | WEIGHT: 315 LBS | HEART RATE: 94 BPM | HEIGHT: 72 IN | BODY MASS INDEX: 42.66 KG/M2 | DIASTOLIC BLOOD PRESSURE: 81 MMHG

## 2020-11-03 DIAGNOSIS — G47.33 OBSTRUCTIVE SLEEP APNEA, ADULT: Primary | ICD-10-CM

## 2020-11-03 DIAGNOSIS — I10 ESSENTIAL HYPERTENSION, MALIGNANT: ICD-10-CM

## 2020-11-03 DIAGNOSIS — I27.81 COR PULMONALE, CHRONIC (HCC): ICD-10-CM

## 2020-11-03 PROCEDURE — G0463 HOSPITAL OUTPT CLINIC VISIT: HCPCS

## 2020-11-03 NOTE — PROGRESS NOTES
"PULMONARY  CONSULT NOTE      PATIENT IDENTIFICATION:  Name: Michael Lopez  Age: 60 y.o.  Sex: male  :  1960  MRN: [id number]    DATE OF CONSULTATION:  11/3/2020   Referring Physician: No admitting provider for patient encounter.                  CHIEF COMPLAINT: NATALIYA    History of Present Illness:   Michael Lopez is a 60 y.o. male Pt on CPAP feeling better more energy especially the night he use it more than 4 hours, no sleepiness no fatigue no tiredness, no mask irritation no dryness, compliance report reviewed with pt AHI< 5 with good usage.  Patient feels that he needs more pressure  He had recent head trauma    Review of Systems:   Constitutional: negative   Eyes: negative   ENT/oropharynx: negative   Cardiovascular: negative   Respiratory: negative   Gastrointestinal: negative   Genitourinary: negative   Neurological: negative   Musculoskeletal: negative   Integument/breast: negative   Endocrine: negative   Allergic/Immunologic: negative     Past Medical History:  Past Medical History:   Diagnosis Date   • Arthritis    • Back pain    • GERD (gastroesophageal reflux disease)    • Hernia, inguinal    • Hypertension        Past Surgical History:  Past Surgical History:   Procedure Laterality Date   • ABDOMINAL HERNIA REPAIR N/A    • COLONOSCOPY N/A    • COLONOSCOPY N/A 2019    Procedure: COLONOSCOPY INTO CECUM;  Surgeon: Genaro Blackwell MD;  Location: [facility] ENDOSCOPY;  Service: General   • ENDOSCOPY N/A 2019    Procedure: ESOPHAGOGASTRODUODENOSCOPY;  Surgeon: Genaro Blackwell MD;  Location: [facility] ENDOSCOPY;  Service: General   • EYE SURGERY      as a child for \"cross sided\"        Family History:  Family History   Problem Relation Age of Onset   • Diverticulitis Mother    • Arthritis Mother    • Heart disease Father    • Colon polyps Father    • No Known Problems Sister    • Cancer Maternal Grandmother         cancer hx in their 90's   • Cancer Paternal Grandfather         cancer hx in " their 90's        Social History:   Social History     Tobacco Use   • Smoking status: Never Smoker   • Smokeless tobacco: Never Used   Substance Use Topics   • Alcohol use: Yes     Alcohol/week: 1.0 - 2.0 standard drinks     Types: 1 - 2 Standard drinks or equivalent per week     Comment: OCCASIONAL        Allergies:  No Known Allergies    Home Meds:  (Not in a hospital admission)      Objective:    Vitals Ranges:   Heart Rate:  [94] 94  BP: (139)/(81) 139/81  Body mass index is 47.74 kg/m².     Exam:  General Appearance:  WDWN    HEENT:   without obvious abnormality,  Conjunctiva/corneas clear,  Normal external ear canals, no drainage    Clear orsalmucosa,  Mallampati score 3    Neck:  Supple, symmetrical, trachea midline. No JVD.  Lungs:   Bilateral basal rhonchi bilaterally, respirations unlabored symmetrical wall movement.    Chest wall:  No tenderness or deformity.    Heart:  Regular rate and rhythm, S1 and S2 normal.  Extremities: Trace edema no clubbing or Cyanosis        Data Review:  All labs (24hrs): No results found for this or any previous visit (from the past 24 hour(s)).     Imaging:  CT Head Without Contrast  Narrative: CT Head WO    HISTORY:   Hit anterior head at work 2 weeks ago. No loss of consciousness but persistent headache and nausea and dizziness.    TECHNIQUE:   Axial unenhanced head CT. Radiation dose reduction techniques included automated exposure control or exposure modulation based on body size. Count of known CT and cardiac nuc med studies performed in previous 12 months: 0.     Time of scan: 1219 hours    COMPARISON:   None.    FINDINGS:   No intracranial hemorrhage, mass, or infarct. No hydrocephalus or extra-axial fluid collection. Brain parenchymal density is normal. The skull base, calvarium, and extracranial soft tissues are normal.  Impression: Normal, negative unenhanced head CT.    Signer Name: LIZZ Jackman MD   Signed: 10/8/2020 12:39 PM   Workstation Name: [id number]     [facility]       ASSESSMENT:  Diagnoses and all orders for this visit:    Obstructive sleep apnea, adult    Cor pulmonale, chronic (CMS/HCC)    Essential hypertension, malignant        PLAN:  Change to auto CPAP 10 to 20 cm this patient with obstructive sleep apnea, compliance is improved. Encourage to use it more frequent, I re-emphasized on pt the long and short term benefit of treating NATALIYA.     Treating NATALIYA will improve BP control and cor pulmonale symptoms       Corey Parish MD. D, ABSM.  11/3/2020  13:44 EST

## 2020-11-12 ENCOUNTER — HOSPITAL ENCOUNTER (OUTPATIENT)
Dept: MRI IMAGING | Facility: HOSPITAL | Age: 60
Discharge: HOME | End: 2020-11-12
Admitting: NURSE PRACTITIONER

## 2020-11-12 PROCEDURE — 70551 MRI BRAIN STEM W/O DYE: CPT

## 2020-12-18 ENCOUNTER — HOSPITAL ENCOUNTER (OUTPATIENT)
Dept: SPEECH THERAPY | Facility: HOSPITAL | Age: 60
Setting detail: THERAPIES SERIES
Discharge: HOME | End: 2020-12-18

## 2020-12-18 DIAGNOSIS — F06.8 COGNITIVE DEFICIT AS LATE EFFECT OF TRAUMATIC BRAIN INJURY (HCC): Primary | ICD-10-CM

## 2020-12-18 DIAGNOSIS — S09.90XS INJURY OF HEAD, SEQUELA: ICD-10-CM

## 2020-12-18 DIAGNOSIS — S06.9X0S COGNITIVE DEFICIT AS LATE EFFECT OF TRAUMATIC BRAIN INJURY (HCC): Primary | ICD-10-CM

## 2020-12-18 PROCEDURE — 96125 COGNITIVE TEST BY HC PRO: CPT
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
## 2020-12-29 ENCOUNTER — HOSPITAL ENCOUNTER (OUTPATIENT)
Dept: SPEECH THERAPY | Facility: HOSPITAL | Age: 60
Setting detail: THERAPIES SERIES
Discharge: HOME | End: 2020-12-29

## 2020-12-29 DIAGNOSIS — S09.90XS INJURY OF HEAD, SEQUELA: ICD-10-CM

## 2020-12-29 DIAGNOSIS — F06.8 COGNITIVE DEFICIT AS LATE EFFECT OF TRAUMATIC BRAIN INJURY (HCC): Primary | ICD-10-CM

## 2020-12-29 DIAGNOSIS — S06.9X0S COGNITIVE DEFICIT AS LATE EFFECT OF TRAUMATIC BRAIN INJURY (HCC): Primary | ICD-10-CM

## 2020-12-29 PROCEDURE — 97129 THER IVNTJ 1ST 15 MIN: CPT

## 2020-12-29 PROCEDURE — 97130 THER IVNTJ EA ADDL 15 MIN: CPT

## 2020-12-29 NOTE — THERAPY TREATMENT NOTE
Outpatient Speech Language Pathology   Adult Speech Language Cognitive Treatment Note  DONYA Art     Patient Name: Michael Lopez  : 1960  MRN: [id number]  Today's Date: 2020         Visit Date: 2020   Patient Active Problem List   Diagnosis   • Hypertension   • Neuritis or radiculitis due to rupture of lumbar intervertebral disc   • Snoring   • Essential hypertension, malignant   • Obstructive sleep apnea, adult   • Cor pulmonale, chronic (CMS/HCC)   • Oropharyngeal dysphagia   • Cervical pain   • Acute shoulder pain          Visit Dx:    ICD-10-CM ICD-9-CM   1. 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 Injury of head, sequela  S09.90XS 908.9       SLP [facility] Evaluation - 20 1205        Standardized Tests    Cognitive/Memory Tests  RBANS: Repeatable Battery for the Assessment of Neuropsychological Status    form a  -AD       RBANS- Repeatable Battery for the Assessment of Neuropsychological Status    Immediate Memory Index Score  90   -AD    Immediate Memory Percentile  25 %   -AD    Immediate Memory Qualitative Description  average   -AD    Visuospatial Index Score  112   -AD    Visuospatial Percentile  79 %   -AD    Visuospatial Qualitative Description  high average   -AD    Language Index Score  82   -AD    Language Percentile  12 %   -AD    Language Qualitative Description  low average   -AD    Attention Index Score  85   -AD    Attention Percentile  16 %   -AD    Attention Qualitative Description  low average   -AD    Delayed Memory Index Score  75   -AD    Delayed Memory Percentile  5 %   -AD    Delayed Memory Qualitative Description  borderline   -AD    Total Index Score  444   -AD    Total Percentile  14 %    Standard Score of 84  -AD    Total Qualitative Description  low average   -AD    RBANS Comments  Pt demonstrates mild deficits overall with greatest deficit in Delayed Memory. Low average scores as well in language  and attention.Visual attention and focus appeared improved from prior testing on 12/18/20. He demonstrated improved visuospatial scores as compared with results on the CLQT. Low average functioning on list learning, picture naming (unable to name trumpet) and list recognition. Borderline skills obtained on semantic fluency, coding, list recall, story recall and figure recall. These indicate difficulty retrieving information from long-term memory stores and processing speeds. Repetition did demonstrate some improvement in immediate recall and story recall indicated better learning and encoding of material when the information is related.    -AD      User Key  (r) = Recorded By, (t) = Taken By, (c) = Cosigned By    Initials Name Provider Type    AD Elke Schwartz MS CCC-SLP Speech and Language Pathologist         The Repeatable Battery for the assessment of Neuropsychological Status (RBANS®) is a brief, individually administered test measuring attention, language, visuospatial/constructional abilities, and immediate and delayed memory. RBANS is intended for use with adolescents to adults, ages 12 to 89 year.    Domains/Subtests Total Score Index Scaled Score Percentile Group Qualitative Descriptions   IMMEDIATE MEMORY  90  25 Average   List Learning 23  6  Low Average   Story Memory 17  11  Average   VISUOSPATIAL/CONSTRUCTIONAL SKILLS  112  79 High Average   Figure Copy 19  11  Average   Line Orientation 19   >75 High Average   LANGUAGE  82  17-25 Low Average   Picture Naming 9   17-25 Low Average   Semantic Fluency 12  4  Borderline   ATTENTION  85  16 Low Average   Digit Span 11  11  Average   Coding 27  4  Borderline   DELAYED MEMORY  75  5 Borderline   List Recall 3   3-9 Borderline   List Recognition 18   10-16 Low Average   Story Recall 5  5  Borderline   Figure Recall 7  5  Borderline   Sum of Total Scores for List/Story/Figure Recall 15       Sum of Index Scores 444       TOTAL SCALE  14  14 Low Average          SLP OP Goals     Row Name 12/29/20 1205          Goal Type Needed    Goal Type Needed  Executive Function;Memory;Other Adult Goals  -AD        Subjective Comments    Subjective Comments  Pt was seen in therapy for 55 minutes and was alert and cooperative. Some complaints of fatigue and headache.   -AD        Subjective Pain    Able to rate subjective pain?  yes  -AD     Pre-Treatment Pain Level  4  -AD     Post-Treatment Pain Level  6  -AD     Subjective Pain Comment  Increasing headache after cognitive activity reported.   -AD        Executive Function Goals    Executive Function LTG's  Patient will be able to use high level cognitive skills to allow patient to return to work  -AD     Patient will be able to use high level cognitive skills to allow patient to return to work  90%:;without cues  -AD     Status: Patient will be able to use high level cognitive skills to allow patient to return to work  New  -AD     Comments: Patient will be able to use high level cognitive skills to allow patient to return to work  Not targeted due to completion of RBANS test.   -AD     Patient will improve executive functioning skills by identifying the tasks and problems where impairments interfere  90%:;without cues  -AD     Status: Patient will improve executive functioning skills by identifying the tasks and problems where impairments interfere  New  -AD     Comments: Patient will improve executive functioning skills by identifying the tasks and problems where impairments interfere  Not targeted due to completion of RBANS test.   -AD     Patient will improve executive functioning skills by identifying and describing the nature of the executive dysfunction  90%:;without cues  -AD     Status: Patient will improve executive functioning skills by identifying and describing the nature of the executive dysfunction  New  -AD     Comments: Patient will improve executive functioning skills by identifying and describing the nature  of the executive dysfunction  Not targeted due to completion of RBANS test.   -AD     Patient will improve executive functioning skills by using planning strategies prior to beginning tasks  90%:;without cues  -AD     Status: Patient will improve executive functioning skills by using planning strategies prior to beginning tasks  New  -AD     Comments: Patient will improve executive functioning skills by using planning strategies prior to beginning tasks  Not targeted due to completion of RBANS test.   -AD        Memory Goals    Patient will be able to remember information needed to return to work and function on work-related tasks  90%:;without cues  -AD     Status: Patient will be able to remember information needed to return to work and function on work-related tasks  New  -AD     Comments: Patient will be able to remember information needed to return to work and function on work-related tasks  Not targeted due to completion of RBANS test.   -AD     Patient will demonstrate improved ability to recall information by immediately recalling a series of words  90%:;unrelated;with no delay;without cues  -AD     Status: Patient will demonstrate improved ability to recall information by immediately recalling a series of words  New  -AD     Patient’s memory skills will be enhanced as reported by patient by utilizing internal memory strategies to recall up to 3 pieces of information after a 5- minute delay  90%:;without cues 5-7 pieces of information; over 30 minute time span  -AD     Status: Patient’s memory skills will be enhanced as reported by patient by utilizing internal memory strategies to recall up to 3 pieces of information after a 5- minute delay  New  -AD        Other Goals    Other Adult Goal- 1  Pt will participate in further evaluation of memory skills for further goal planning in 2 weeks  -AD     Status: Other Adult Goal- 1  Achieved  -AD     Comments: Other Adult Goal- 1  Completed the RBANS form a for  further memory testing. See results.   -AD     Other Adult Goal- 2  Pt will improve executive function skills by using a self-instructional technique and meta-cognitive strategies (e.g. planning, monitoring) before, during and afte tasks in order to complete familiar tasks on 90% of trials without cues.   -AD     Status: Other Adult Goal- 2  New  -AD     Comments: Other Adult Goal- 2  Not targeted due to completion of RBANS test.   -AD     Other Adult Goal- 3  Pt will improve executive function skills by using a self-instructional technique and meta-cognitive strategies (e.g. planning, monitoring) before, during and afte tasks in order to complete novel tasks on 90% of trials without cues.   -AD     Status: Other Adult Goal- 3  New  -AD     Comments: Other Adult Goal- 3  Not targeted due to completion of RBANS test.   -AD     Other Adult Goal- 4  Pt will be able to report s/s of post concussion with increased cognitive and physical activity and report techniques to aid with increasing activity w/o symptoms in order to improve overall cognitive functioning over 3 sessions w/o cues.   -AD     Status: Other Adult Goal- 4  New  -AD        SLP Time Calculation    SLP Goal Re-Cert Due Date  02/18/21  -AD       User Key  (r) = Recorded By, (t) = Taken By, (c) = Cosigned By    Initials Name Provider Type    Elke Mattson MS CCC-SLP Speech and Language Pathologist          OP SLP Education     Row Name 12/29/20 1205       Education    Education Provided  Described results of evaluation;Patient expressed understanding of evaluation  -AD    Learning Method  Explanation  -AD    Teaching Response  Verbalized understanding  -AD      User Key  (r) = Recorded By, (t) = Taken By, (c) = Cosigned By    Initials Name Effective Dates    Elke Mattson MS CCC-SLP 08/09/20 -           OP SLP Assessment/Plan - 12/29/20 1205        SLP Assessment    Clinical Impression Comments  Pt continues to demonstrate mild cognitive  communication impairment in the area of executive functions, visuospatial skills and delayed memory. Low average skills in Language and Attention also noted. Pt continues with difficulty participating w/o concussion symptoms such as increasing headache, nausea and dizziness.  See RBANS results.    -AD    SLP Diagnosis  Mild cognitive communication deficits   -AD       SLP Plan    Plan Comments  Will continue with therapy and target STGs at next session. Will also work on recognition of s/s of  post concussion and how to build on cognitive and physical activities.    -AD      User Key  (r) = Recorded By, (t) = Taken By, (c) = Cosigned By    Initials Name Provider Type    AD Elke Schwartz MS CCC-SLP Speech and Language Pathologist           Time Calculation:   SLP Start Time: 1110  SLP Stop Time: 1205  SLP Time Calculation (min): 55 min  SLP Non-Billable Time (min): 0 min  Total Timed Code Minutes- SLP: 55 minute(s)    Therapy Charges for Today     Code Description Service Date Service Provider Modifiers Qty    44449553777 HC ST DEV OF COGN SKILLS INITIAL 15 MIN 12/29/2020 Elke Schwartz MS CCC-SLP GN 1    78657591557 HC ST DEV OF COGN SKILLS EACH ADDT'L 15 MIN 12/29/2020 Elke Schwartz MS CCC-SLP GN 3            Elke Schwartz MS CCC-SLP  12/29/2020

## 2021-01-05 ENCOUNTER — HOSPITAL ENCOUNTER (OUTPATIENT)
Dept: SPEECH THERAPY | Facility: HOSPITAL | Age: 61
Setting detail: THERAPIES SERIES
Discharge: HOME | End: 2021-01-05

## 2021-01-05 DIAGNOSIS — S06.9X0S COGNITIVE DEFICIT AS LATE EFFECT OF TRAUMATIC BRAIN INJURY (HCC): Primary | ICD-10-CM

## 2021-01-05 DIAGNOSIS — S09.90XS INJURY OF HEAD, SEQUELA: ICD-10-CM

## 2021-01-05 DIAGNOSIS — F06.8 COGNITIVE DEFICIT AS LATE EFFECT OF TRAUMATIC BRAIN INJURY (HCC): Primary | ICD-10-CM

## 2021-01-05 PROCEDURE — 97130 THER IVNTJ EA ADDL 15 MIN: CPT

## 2021-01-05 PROCEDURE — 97129 THER IVNTJ 1ST 15 MIN: CPT

## 2021-01-05 NOTE — THERAPY TREATMENT NOTE
Outpatient Speech Language Pathology   Adult Speech Language Cognitive Treatment Note  DONYA Art     Patient Name: Michael Lopez  : 1960  MRN: [id number]  Today's Date: 2021         Visit Date: 2021   Patient Active Problem List   Diagnosis   • Hypertension   • Neuritis or radiculitis due to rupture of lumbar intervertebral disc   • Snoring   • Essential hypertension, malignant   • Obstructive sleep apnea, adult   • Cor pulmonale, chronic (CMS/HCC)   • Oropharyngeal dysphagia   • Cervical pain   • Acute shoulder pain          Visit Dx:    ICD-10-CM ICD-9-CM   1. 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 Injury of head, sequela  S09.90XS 908.9         SLP OP Goals     Row Name 21 1115          Goal Type Needed    Goal Type Needed  Executive Function;Memory;Other Adult Goals  -AD        Subjective Comments    Subjective Comments  Pt was seen in therapy for 45 minutes and was alert and cooperative. Stating he is doing better today. He was bridger to walk into the therapy session with no reports of dizziness or nausea. Pt wearing foam ear plugs to decrease incoming sound.   -AD        Subjective Pain    Able to rate subjective pain?  yes  -AD     Pre-Treatment Pain Level  3  -AD     Post-Treatment Pain Level  6  -AD     Subjective Pain Comment  Increasing headache with reports of nausea at the end of the session. Increasing headache reportedly due to cognitive activity and noise.   -AD        Executive Function Goals    Executive Function LTG's  Patient will be able to use high level cognitive skills to allow patient to return to work  -AD     Patient will be able to use high level cognitive skills to allow patient to return to work  90%:;without cues  -AD     Status: Patient will be able to use high level cognitive skills to allow patient to return to work  Progressing as expected  -AD     Comments: Patient will be able to use high  level cognitive skills to allow patient to return to work  Pt able to demonstrate improved recall and working memory during therapy tasks today as compared to prior sessions focused on assessment. Pt became symptomatic after 40 minutes of treatment. Goal progressing.  -AD     Patient will improve executive functioning skills by identifying the tasks and problems where impairments interfere  90%:;without cues  -AD     Status: Patient will improve executive functioning skills by identifying the tasks and problems where impairments interfere  Progressing as expected  -AD     Comments: Patient will improve executive functioning skills by identifying the tasks and problems where impairments interfere  Pt is able to demonstrate identification of tasks that increase symptoms which are related auditory stimuli. He required minimal consistent verbal prompts and cues to demonstrate the abiilty to identify the threshold level related to the level at which he is unable to perform both cognitive or physical activity.   -AD     Patient will improve executive functioning skills by identifying and describing the nature of the executive dysfunction  90%:;without cues  -AD     Status: Patient will improve executive functioning skills by identifying and describing the nature of the executive dysfunction  New  -AD     Comments: Patient will improve executive functioning skills by identifying and describing the nature of the executive dysfunction  Not targeted due to focus on other goals.   -AD     Patient will improve executive functioning skills by using planning strategies prior to beginning tasks  90%:;without cues  -AD     Status: Patient will improve executive functioning skills by using planning strategies prior to beginning tasks  Progressing as expected  -AD     Comments: Patient will improve executive functioning skills by using planning strategies prior to beginning tasks  Pt was able to use planning strategies during a  recall task with minimal consistent cues given by SLP on 6/6 trials.   -AD        Memory Goals    Patient will be able to remember information needed to return to work and function on work-related tasks  90%:;without cues  -AD     Status: Patient will be able to remember information needed to return to work and function on work-related tasks  Progressing as expected  -AD     Comments: Patient will be able to remember information needed to return to work and function on work-related tasks  Pt demonstrating increased immediate recall during the session with items given falling into 3 different categories. Goal is progressing.  -AD     Patient will demonstrate improved ability to recall information by immediately recalling a series of words  90%:;unrelated;with no delay;without cues  -AD     Status: Patient will demonstrate improved ability to recall information by immediately recalling a series of words  Progressing as expected  -AD     Comments: Patient will demonstrate improved ability to recall information by immediately recalling a series of words  Pt was able to recall items from a list of 12 w/categorically related items (3 categories per task) with 76% and minimal consistent verbal prompts and repetition. Pt demonstrated improved recall from prior testing but items could be categorized and pt did demonstrate some repetition of items at times. Pt able to demonstrate holding of words given and able to manipulate as well and give back in categorical groups.   -AD     Patient’s memory skills will be enhanced as reported by patient by utilizing internal memory strategies to recall up to 3 pieces of information after a 5- minute delay  90%:;without cues 5-7 pieces of information over 30 minute time span.  -AD     Status: Patient’s memory skills will be enhanced as reported by patient by utilizing internal memory strategies to recall up to 3 pieces of information after a 5- minute delay  New  -AD     Comments:  Patient’s memory skills will be enhanced as reported by patient by utilizing internal memory strategies to recall up to 3 pieces of information after a 5- minute delay  Not targeted due to focus on other goals.   -AD        Other Goals    Other Adult Goal- 2  Pt will improve executive function skills by using a self-instructional technique and meta-cognitive strategies (e.g. planning, monitoring) before, during and afte tasks in order to complete familiar tasks on 90% of trials without cues.   -AD     Status: Other Adult Goal- 2  New  -AD     Comments: Other Adult Goal- 2  Not targeted due to focus on other goals.   -AD     Other Adult Goal- 3  Pt will improve executive function skills by using a self-instructional technique and meta-cognitive strategies (e.g. planning, monitoring) before, during and after tasks in order to complete novel tasks on 90% of trials without cues.   -AD     Status: Other Adult Goal- 3  New  -AD     Comments: Other Adult Goal- 3  Nto targeted due to focus on other goal.   -AD     Other Adult Goal- 4  Pt will be able to report s/s of post concussion with increased cognitive and physical activity and report techniques to aid with increasing activity w/o symptoms in order to improve overall cognitive functioning over 3 sessions w/o cues.   -AD     Status: Other Adult Goal- 4  Progressing as expected  -AD     Comments: Other Adult Goal- 4  Long discussion regarding symptoms of post concussion and perfomance during both cognitive and physical activity with awareness of symptom levels. Pt able to report that he is at a baseline level 3 at all times with regards to a headache without any type of actiivity. He is able to demonstrate awareness that if he reaches a Level 6 on his headache that other symptoms accompany it including shooting pains in the head and nausea/dizziness. He is able to verbalize that loud sounds, background noise and settings with more than 2 people at a time increase  symptoms. He has starting using foam ear plugs to dampen sounds. Discussion indicates that he is able to function at Levels 3, 4, 5 but requires complete rest after reaching a Level 6 for even a short amount of time. Pt was able to rate his level of pain/symptoms during this session with consistent verbal prompts/cues from SLP and was able to stop cognitive activity when he reached a Level 6 with reported increasing headache and nausea. Pt was able to walk out of the therapy room with no dizziness reported.   -AD        SLP Time Calculation    SLP Goal Re-Cert Due Date  02/18/21  -AD       User Key  (r) = Recorded By, (t) = Taken By, (c) = Cosigned By    Initials Name Provider Type    Elke Mattson MS CCC-SLP Speech and Language Pathologist          OP SLP Education     Row Name 01/05/21 1115       Education    Learning Method  Explanation;Demonstration;Teach back  -AD    Teaching Response  Verbalized understanding;Demonstrated understanding  -AD    Education Comments  Discussed symptoms of post concussion including headache, shooting pains in head, nausea and dizziness. Had pt rate symptoms on a scale from 1-10 for when symptoms appear. He was able to demonstrate understanding of his baseline, when symptoms begin to increase and level at which they affect cognitive performance (Level 6). Understanding of stopping activity when he reaches that level and resuming activity when symptoms lessen or resolve. Pt able to verbalize back levels and demonstrate within the session. Pt also able to verbalize cognitive activities to perform at home with his wife such as puzzles and game activities.   -AD      User Key  (r) = Recorded By, (t) = Taken By, (c) = Cosigned By    Initials Name Effective Dates    Elke Mattson MS CCC-SLP 08/09/20 -           OP SLP Assessment/Plan - 01/05/21 1115        SLP Assessment    Clinical Impression Comments  Pt demonstrates progress towards his functional goals with increased  awareness of symptomology and need for decrease in activity to keep from reaching a point where he cannot perform any activity. Frustration reported by patient with not being better at this point in time, stating he feels like he should be back to work after 3 months. Pt reports that he stayed in bed in the dark for 6 weeks after his injury, but SLP feels that he failed to slowly engage in activity and has demonstrated severe symptoms at times, limiting his ability to return to premorbid functioning.    -AD    SLP Diagnosis  Mild cognitive communication deficits   -AD       SLP Plan    Plan Comments  Will continue with current goals re: memory and executive functions. Also feel patient may benefit from physical therapy to increase physical activity w/o symptoms r/t head injury.    -AD      User Key  (r) = Recorded By, (t) = Taken By, (c) = Cosigned By    Initials Name Provider Type    Elke Mattson, MS CCC-SLP Speech and Language Pathologist                 Time Calculation:   SLP Start Time: 1030  SLP Stop Time: 1115  SLP Time Calculation (min): 45 min  SLP Non-Billable Time (min): 0 min  Total Timed Code Minutes- SLP: 45 minute(s)    Therapy Charges for Today     Code Description Service Date Service Provider Modifiers Qty    43071181217 HC ST DEV OF COGN SKILLS INITIAL 15 MIN 1/5/2021 Elke Schwartz MS CCC-SLP GN 1    43621920130 HC ST DEV OF COGN SKILLS EACH ADDT'L 15 MIN 1/5/2021 Elke Schwartz MS CCC-SLP GN 2                   Elke Schwartz MS CCC-SLP  1/5/2021

## 2021-01-11 ENCOUNTER — TRANSCRIBE ORDERS (OUTPATIENT)
Dept: ADMINISTRATIVE | Facility: HOSPITAL | Age: 61
End: 2021-01-11

## 2021-01-11 ENCOUNTER — HOSPITAL ENCOUNTER (OUTPATIENT)
Dept: SPEECH THERAPY | Facility: HOSPITAL | Age: 61
Setting detail: THERAPIES SERIES
Discharge: HOME | End: 2021-01-11

## 2021-01-11 DIAGNOSIS — S06.9X0S COGNITIVE DEFICIT AS LATE EFFECT OF TRAUMATIC BRAIN INJURY (HCC): Primary | ICD-10-CM

## 2021-01-11 DIAGNOSIS — S02.0XXA CLOSED FRACTURE OF VAULT OF SKULL, LOSS OF CONSCIOUSNESS (HCC): ICD-10-CM

## 2021-01-11 DIAGNOSIS — S09.90XS INJURY OF HEAD, SEQUELA: ICD-10-CM

## 2021-01-11 DIAGNOSIS — S06.9X9A CLOSED FRACTURE OF VAULT OF SKULL, LOSS OF CONSCIOUSNESS (HCC): ICD-10-CM

## 2021-01-11 DIAGNOSIS — F06.8 COGNITIVE DEFICIT AS LATE EFFECT OF TRAUMATIC BRAIN INJURY (HCC): Primary | ICD-10-CM

## 2021-01-11 DIAGNOSIS — R51.9 NONINTRACTABLE HEADACHE, UNSPECIFIED CHRONICITY PATTERN, UNSPECIFIED HEADACHE TYPE: Primary | ICD-10-CM

## 2021-01-11 PROCEDURE — 97130 THER IVNTJ EA ADDL 15 MIN: CPT

## 2021-01-11 PROCEDURE — 97129 THER IVNTJ 1ST 15 MIN: CPT

## 2021-01-11 NOTE — THERAPY TREATMENT NOTE
Outpatient Speech Language Pathology   Adult Speech Language Cognitive Treatment Note  DONYA Art     Patient Name: Michael Lopez  : 1960  MRN: [id number]  Today's Date: 2021         Visit Date: 2021   Patient Active Problem List   Diagnosis   • Hypertension   • Neuritis or radiculitis due to rupture of lumbar intervertebral disc   • Snoring   • Essential hypertension, malignant   • Obstructive sleep apnea, adult   • Cor pulmonale, chronic (CMS/HCC)   • Oropharyngeal dysphagia   • Cervical pain   • Acute shoulder pain          Visit Dx:    ICD-10-CM ICD-9-CM   1. 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 Injury of head, sequela  S09.90XS 908.9         SLP OP Goals     Row Name 21 0940          Goal Type Needed    Goal Type Needed  Executive Function;Memory;Other Adult Goals  -AD        Subjective Comments    Subjective Comments  Pt was seen in therapy for 45 minutes and was alert and cooperative. States he had a sinus infection and stayed in bed most of the weekend, but has had lessening of symptoms and improvement in headache.   -AD        Subjective Pain    Able to rate subjective pain?  yes  -AD     Pre-Treatment Pain Level  3  -AD     Post-Treatment Pain Level  4  -AD     Subjective Pain Comment  Pt reports only slight increase in his headache to a level 4 after cognitive activity today. Improved overall affect noted as well.   -AD        Executive Function Goals    Executive Function LTG's  Patient will be able to use high level cognitive skills to allow patient to return to work  -AD     Patient will be able to use high level cognitive skills to allow patient to return to work  90%:;without cues  -AD     Status: Patient will be able to use high level cognitive skills to allow patient to return to work  Progressing as expected  -AD     Comments: Patient will be able to use high level cognitive skills to allow patient to  return to work  Pt reports increased recall of how to perform work related events such as wiring. Did not perform directly in the therapy session. Just verbalized.   -AD     Patient will improve executive functioning skills by identifying the tasks and problems where impairments interfere  90%:;without cues  -AD     Status: Patient will improve executive functioning skills by identifying the tasks and problems where impairments interfere  Progressing as expected  -AD     Comments: Patient will improve executive functioning skills by identifying the tasks and problems where impairments interfere  Pt reporting increased awareness of tasks that increase symptoms and reports some participation over the last week without increasing symptoms. Limited targeting due to sinus infection over the weekend.   -AD     Patient will improve executive functioning skills by identifying and describing the nature of the executive dysfunction  90%:;without cues  -AD     Status: Patient will improve executive functioning skills by identifying and describing the nature of the executive dysfunction  New  -AD     Comments: Patient will improve executive functioning skills by identifying and describing the nature of the executive dysfunction  Not targeted due to focus on other goals.   -AD     Patient will improve executive functioning skills by using planning strategies prior to beginning tasks  90%:;without cues  -AD     Status: Patient will improve executive functioning skills by using planning strategies prior to beginning tasks  Progressing as expected  -AD     Comments: Patient will improve executive functioning skills by using planning strategies prior to beginning tasks  Pt was able to demonstrate planning during a functional task with initial minimal consistent verbal prompts that were faded half way through the session.   -AD        Memory Goals    Patient will be able to remember information needed to return to work and function  on work-related tasks  90%:;without cues  -AD     Status: Patient will be able to remember information needed to return to work and function on work-related tasks  Progressing as expected  -AD     Patient will demonstrate improved ability to recall information by immediately recalling a series of words  90%:;unrelated;with no delay;without cues  -AD     Comments: Patient will demonstrate improved ability to recall information by immediately recalling a series of words  Not targeted during this session due to focus on other ST memory goals.   -AD     Patient’s memory skills will be enhanced as reported by patient by utilizing internal memory strategies to recall up to 3 pieces of information after a 5- minute delay  90%:;without cues 5-7 pieces of   -AD     Status: Patient’s memory skills will be enhanced as reported by patient by utilizing internal memory strategies to recall up to 3 pieces of information after a 5- minute delay  Progressing as expected  -AD     Comments: Patient’s memory skills will be enhanced as reported by patient by utilizing internal memory strategies to recall up to 3 pieces of information after a 5- minute delay  Pt was able to recall 4 unrelated words with 82% and initial cues to use internal strategies such as a sentence to tie the words together or a spaced retrieval technique. Pt initially trying to rehearse and memorize all of the words. He could only recall with 25% on the first trials. Use of sentence provided by SLP increased second attempt to 100%. Pt then utilized a spaced retrieval/repetition techniques by repeating the words intermittenlty during another tasks and then was able to repeat after a 5 minute delay on 100% for 2 more trials. SLP attempted to have patient recall the 12 words at the end of the session. He was only able to recall 2/12 and unable to recall any more with verbal prompts or cues.   -AD        Other Goals    Other Adult Goal- 2  Pt will improve executive  function skills by using a self-instructional technique and meta-cognitive strategies (e.g. planning, monitoring) before, during and after tasks in order to complete familiar tasks on 90% of trials without cues.   -AD     Status: Other Adult Goal- 2  Progressing as expected  -AD     Comments: Other Adult Goal- 2  Pt was able to utilize techniques of looking over the information and then initiating and completing the task on 70% of trials with initial and then fading cues.   -AD     Other Adult Goal- 3  Pt will improve executive function skills by using a self-instructional technique and meta-cognitive strategies (e.g. planning, monitoring) before, during and after tasks in order to complete novel tasks on 90% of trials without cues.   -AD     Status: Other Adult Goal- 3  New  -AD     Comments: Other Adult Goal- 3  Nto targeted due to focus on other goal.   -AD     Other Adult Goal- 4  Pt will be able to report s/s of post concussion with increased cognitive and physical activity and report techniques to aid with increasing activity w/o symptoms in order to improve overall cognitive functioning over 3 sessions w/o cues.   -AD     Status: Other Adult Goal- 4  Progressing as expected  -AD     Comments: Other Adult Goal- 4  Pt was able to demonstrate awareness of symptoms and reports monitoring during activity and ability to keep symptoms at a Level 3-4 without becoming very symptomatic and being unable to perform further.   -AD        SLP Time Calculation    SLP Goal Re-Cert Due Date  02/18/21  -AD       User Key  (r) = Recorded By, (t) = Taken By, (c) = Cosigned By    Initials Name Provider Type    AD Elke Schwartz MS CCC-SLP Speech and Language Pathologist          OP SLP Education     Row Name 01/11/21 0915       Education    Learning Method  Explanation;Demonstration  -AD    Teaching Response  Verbalized understanding;Reinforcement needed  -AD    Education Comments  Pt needs continued reinforcement of use of  internal strategies and use of planning/monitoring strategies throughout the entire activity.   -AD      User Key  (r) = Recorded By, (t) = Taken By, (c) = Cosigned By    Initials Name Effective Dates    Elke Mattson MS CCC-SLP 08/09/20 -           OP SLP Assessment/Plan - 01/11/21 0940        SLP Assessment    Clinical Impression Comments  Pt demonstrates improvement with his functional goals and is demonstrating improved participation with less symptoms overall. Pt demonstrating improved short term recall of information and improvement in planning strategie and implementation with minimal consistent verbal cues overall.    -AD    SLP Diagnosis  Mild cognitive communication deficits   -AD       SLP Plan    Plan Comments  Will continue with therapy next week and focus on long term recall strategies as well as self monitoring and planning strategies during novel tasks.    -AD      User Key  (r) = Recorded By, (t) = Taken By, (c) = Cosigned By    Initials Name Provider Type    Elke Mattson MS CCC-SLP Speech and Language Pathologist                 Time Calculation:   SLP Start Time: 0855  SLP Stop Time: 0940  SLP Time Calculation (min): 45 min  SLP Non-Billable Time (min): 0 min  Total Timed Code Minutes- SLP: 45 minute(s)    Therapy Charges for Today     Code Description Service Date Service Provider Modifiers Qty    48757850268 HC ST DEV OF COGN SKILLS INITIAL 15 MIN 1/11/2021 Elke Schwartz MS CCC-SLP GN 1    26771746420 HC ST DEV OF COGN SKILLS EACH ADDT'L 15 MIN 1/11/2021 Elke Schwartz MS CCC-SLP GN 2                   Elke Schwartz MS CCC-SLP  1/11/2021

## 2021-01-18 ENCOUNTER — APPOINTMENT (OUTPATIENT)
Dept: SPEECH THERAPY | Facility: HOSPITAL | Age: 61
End: 2021-01-18

## 2021-01-20 ENCOUNTER — HOSPITAL ENCOUNTER (OUTPATIENT)
Dept: SPEECH THERAPY | Facility: HOSPITAL | Age: 61
Setting detail: THERAPIES SERIES
Discharge: HOME | End: 2021-01-20

## 2021-01-20 DIAGNOSIS — S09.90XS INJURY OF HEAD, SEQUELA: ICD-10-CM

## 2021-01-20 DIAGNOSIS — F06.8 COGNITIVE DEFICIT AS LATE EFFECT OF TRAUMATIC BRAIN INJURY (HCC): Primary | ICD-10-CM

## 2021-01-20 DIAGNOSIS — S06.9X0S COGNITIVE DEFICIT AS LATE EFFECT OF TRAUMATIC BRAIN INJURY (HCC): Primary | ICD-10-CM

## 2021-01-20 PROCEDURE — 97130 THER IVNTJ EA ADDL 15 MIN: CPT

## 2021-01-20 PROCEDURE — 97129 THER IVNTJ 1ST 15 MIN: CPT

## 2021-01-20 NOTE — THERAPY TREATMENT NOTE
Outpatient Speech Language Pathology   Adult Speech Language Cognitive Treatment Note  DON[facility] Art     Patient Name: Michael Lopez  : 1960  MRN: [id number]  Today's Date: 2021         Visit Date: 2021   Patient Active Problem List   Diagnosis   • Hypertension   • Neuritis or radiculitis due to rupture of lumbar intervertebral disc   • Snoring   • Essential hypertension, malignant   • Obstructive sleep apnea, adult   • Cor pulmonale, chronic (CMS/HCC)   • Oropharyngeal dysphagia   • Cervical pain   • Acute shoulder pain          Visit Dx:    ICD-10-CM ICD-9-CM   1. 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 Injury of head, sequela  S09.90XS 908.9       SLP SLC Evaluation - 21 0950        Recommendations    Demonstrates Need for Referral to Another Service  neurology;other (see comments)    for further eval of symptom r/t head trauma; symptom control  -AD      User Key  (r) = Recorded By, (t) = Taken By, (c) = Cosigned By    Initials Name Provider Type    AD Elke Schwartz MS CCC-SLP Speech and Language Pathologist            SLP OP Goals     Row Name 21 0950          Goal Type Needed    Goal Type Needed  Executive Function;Memory;Other Adult Goals  -AD        Subjective Comments    Subjective Comments  Pt seen in therapy for 45 minutes and was alert and cooperative. He states he doesn't know what causes his symptoms because he is trying to monitor them and can find no direct cause. He states he doesn't mind this therapy, but also does not feel that it is doing much to help him get better.   -AD        Subjective Pain    Able to rate subjective pain?  yes  -AD     Pre-Treatment Pain Level  3  -AD     Post-Treatment Pain Level  2  -AD     Subjective Pain Comment  Pt reports chronic headache that stays at a Level 3 all the time. When asked to rate at the end of the session, he reports it's about a 2.   -AD        Executive  Function Goals    Executive Function LTG's  Patient will be able to use high level cognitive skills to allow patient to return to work  -AD     Patient will be able to use high level cognitive skills to allow patient to return to work  90%:;without cues  -AD     Status: Patient will be able to use high level cognitive skills to allow patient to return to work  Progressing as expected  -AD     Comments: Patient will be able to use high level cognitive skills to allow patient to return to work  Pt continues to demonstrate mild deficits with planning and executing tasks during the therapy session. He reports symptoms at home when attempting to complete tasks. 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 SLP feels pt may be able to perform if symptoms were lessened on a consistent basis.   -AD     Patient will improve executive functioning skills by identifying the tasks and problems where impairments interfere  90%:;without cues  -AD     Status: Patient will improve executive functioning skills by identifying the tasks and problems where impairments interfere  Progress slower than expected  -AD     Comments: Patient will improve executive functioning skills by identifying the tasks and problems where impairments interfere  Pt reports that he his difficulty is related to symptoms and inabiilty to function with worsening headache, nausea and dizziness. Pt does not related difficulty to be associated with his ability to persist and utilize recall strategies to aid with performance. Decreased insight and awareness of his deficits.   -AD     Patient will improve executive functioning skills by identifying and describing the nature of the executive dysfunction  90%:;without cues  -AD     Status: Patient will improve executive functioning skills by identifying and describing the nature of the executive dysfunction  New  -AD     Comments: Patient will improve executive  functioning skills by identifying and describing the nature of the executive dysfunction  Not targeted due to focus on other goals.   -AD     Patient will improve executive functioning skills by using planning strategies prior to beginning tasks  90%:;without cues  -AD     Status: Patient will improve executive functioning skills by using planning strategies prior to beginning tasks  Progress slower than expected  -AD     Comments: Patient will improve executive functioning skills by using planning strategies prior to beginning tasks  Pt requiring consistent verbal prompts and occasional cues to aid with planning on memory recall task. He was able to demonstrate planning on 50% of trials during this session.   -AD        Memory Goals    Patient will be able to remember information needed to return to work and function on work-related tasks  90%:;without cues  -AD     Status: Patient will be able to remember information needed to return to work and function on work-related tasks  Progress slower than expected  -AD     Comments: Patient will be able to remember information needed to return to work and function on work-related tasks  Pt demonstrates decreased delayed recall on items targeted during the session. He demonstrates need for consistent verbal prompts and cues to use strategies to aid with recall. He reports not being able to remember things at home and that his wife has to remind him to do things. SLP reviewing use of a notebook to aid with recall and checking off if tasks to be done. He reports that his wife likes to tell him to do and watch him to make sure it is done. Polite resistance to external aids suggested.   -AD     Patient will demonstrate improved ability to recall information by immediately recalling a series of words  90%:;unrelated;with no delay;without cues  -AD     Status: Patient will demonstrate improved ability to recall information by immediately recalling a series of words  Achieved   -AD     Comments: Patient will demonstrate improved ability to recall information by immediately recalling a series of words  Pt is able to recall unrelated items immediately  on 100% of trials.   -AD     Patient’s memory skills will be enhanced as reported by patient by utilizing internal memory strategies to recall up to 3 pieces of information after a 5- minute delay  90%:;without cues 5-7 pieces of information; over 30 minute time span  -AD     Status: Patient’s memory skills will be enhanced as reported by patient by utilizing internal memory strategies to recall up to 3 pieces of information after a 5- minute delay  Progress slower than expected  -AD     Comments: Patient’s memory skills will be enhanced as reported by patient by utilizing internal memory strategies to recall up to 3 pieces of information after a 5- minute delay  Pt was able to recall 5 unrelated words after a 5 minute delay with use of verbal prompts and cues to use internal strategies of making up a sentence, ordering by initial letter and/or visual imagery. Pt was able to recall with 90% on initial trial with only use of spaced retrieval by SLP. He was able to recall with 70% on the second set of words using spaced retrieval and SLP providing visual image of words that was removed for later recall (40%, 0%, 100%). Pt wanting to use straight rehearsal to memorize which does not work consistently for him.  -AD        Other Goals    Other Adult Goal- 2  Pt will improve executive function skills by using a self-instructional technique and meta-cognitive strategies (e.g. planning, monitoring) before, during and after tasks in order to complete familiar tasks on 90% of trials without cues.   -AD     Status: Other Adult Goal- 2  Progress slower than expected  -AD     Comments: Other Adult Goal- 2  Pt is able to demonstrate inconsistent use of techniques of rehearsal and spaced retrieval during task to aid with recall. He demonstrates decreased  mental flexibility with tasks.   -AD     Other Adult Goal- 3  Pt will improve executive function skills by using a self-instructional technique and meta-cognitive strategies (e.g. planning, monitoring) before, during and after tasks in order to complete novel tasks on 90% of trials without cues.   -AD     Status: Other Adult Goal- 3  New  -AD     Comments: Other Adult Goal- 3  Nto targeted due to focus on other goal.   -AD     Other Adult Goal- 4  Pt will be able to report s/s of post concussion with increased cognitive and physical activity and report techniques to aid with increasing activity w/o symptoms in order to improve overall cognitive functioning over 3 sessions w/o cues.   -AD     Status: Other Adult Goal- 4  Progressing as expected  -AD     Comments: Other Adult Goal- 4  Pt is able to report awareness of symptoms and has attempted backing off of both cognitive and physical activity when symptoms rise and then trying to increase activity when symptoms decline. He reports that it does not appear to be consistent and he is frustrated with not being able to do anything without ending up in bed for 2-3 days per his report.  -AD        SLP Time Calculation    SLP Goal Re-Cert Due Date  02/18/21  -AD       User Key  (r) = Recorded By, (t) = Taken By, (c) = Cosigned By    Initials Name Provider Type    Elke Mattson MS CCC-SLP Speech and Language Pathologist              OP SLP Assessment/Plan - 01/20/21 0950        SLP Assessment    Clinical Impression Comments  Pt demonstrated achievement of his short term goal related to recall of unrelated words immediately. He did not demonstrate any significant progress towards his other functional goals with reported increased frustration. He demonstrates decreased mental flexibility and polite resistance to strategies to aid with cognitive communcation functioning. Symptoms continue to be a source of frustration and may be playing a part in his abiltiy to  progress.    -AD    SLP Diagnosis  Mild cognitive communication impairment.   -AD       SLP Plan    Plan Comments  Will continue with therapy next week and continue with goals as written. Will target tasks focused on planning versus recall.    -AD      User Key  (r) = Recorded By, (t) = Taken By, (c) = Cosigned By    Initials Name Provider Type    AD Elke Schwartz, MS CCC-SLP Speech and Language Pathologist                 Time Calculation:   SLP Start Time: 0905  SLP Stop Time: 0950  SLP Time Calculation (min): 45 min  SLP Non-Billable Time (min): 0 min  Total Timed Code Minutes- SLP: 45 minute(s)    Therapy Charges for Today     Code Description Service Date Service Provider Modifiers Qty    80201150092 HC ST DEV OF COGN SKILLS INITIAL 15 MIN 1/20/2021 Elke Schwartz MS CCC-SLP GN 1    [id number] HC ST DEV OF COGN SKILLS EACH ADDT'L 15 MIN 1/20/2021 Elke Schwartz, MS CCC-SLP GN 2                   Elke Schwartz MS CCC-SLP  1/20/2021

## 2021-01-22 ENCOUNTER — HOSPITAL ENCOUNTER (OUTPATIENT)
Dept: MRI IMAGING | Facility: HOSPITAL | Age: 61
Discharge: HOME | End: 2021-01-22
Admitting: SPECIALIST

## 2021-01-22 DIAGNOSIS — S06.9X9A CLOSED FRACTURE OF VAULT OF SKULL, LOSS OF CONSCIOUSNESS (HCC): ICD-10-CM

## 2021-01-22 DIAGNOSIS — R51.9 NONINTRACTABLE HEADACHE, UNSPECIFIED CHRONICITY PATTERN, UNSPECIFIED HEADACHE TYPE: ICD-10-CM

## 2021-01-22 DIAGNOSIS — S02.0XXA CLOSED FRACTURE OF VAULT OF SKULL, LOSS OF CONSCIOUSNESS (HCC): ICD-10-CM

## 2021-01-22 PROCEDURE — 70551 MRI BRAIN STEM W/O DYE: CPT

## 2021-01-25 ENCOUNTER — HOSPITAL ENCOUNTER (OUTPATIENT)
Dept: SPEECH THERAPY | Facility: HOSPITAL | Age: 61
Setting detail: THERAPIES SERIES
Discharge: HOME | End: 2021-01-25

## 2021-01-25 DIAGNOSIS — S09.90XS INJURY OF HEAD, SEQUELA: ICD-10-CM

## 2021-01-25 DIAGNOSIS — S06.9X0S COGNITIVE DEFICIT AS LATE EFFECT OF TRAUMATIC BRAIN INJURY (HCC): Primary | ICD-10-CM

## 2021-01-25 DIAGNOSIS — F06.8 COGNITIVE DEFICIT AS LATE EFFECT OF TRAUMATIC BRAIN INJURY (HCC): Primary | ICD-10-CM

## 2021-01-25 PROCEDURE — 97129 THER IVNTJ 1ST 15 MIN: CPT

## 2021-01-25 PROCEDURE — 97130 THER IVNTJ EA ADDL 15 MIN: CPT

## 2021-01-25 RX ORDER — NIFEDIPINE 30 MG/1
30 TABLET, EXTENDED RELEASE ORAL DAILY
Qty: 30 TABLET | Refills: 0 | Status: SHIPPED | OUTPATIENT
Start: 2021-01-25 | End: 2021-10-28

## 2021-01-26 NOTE — THERAPY TREATMENT NOTE
Outpatient Speech Language Pathology   Adult Speech Language Cognitive Treatment Note  DONYA Art     Patient Name: Michael Lopez  : 1960  MRN: [id number]  Today's Date: 2021         Visit Date: 2021   Patient Active Problem List   Diagnosis   • Hypertension   • Neuritis or radiculitis due to rupture of lumbar intervertebral disc   • Snoring   • Essential hypertension, malignant   • Obstructive sleep apnea, adult   • Cor pulmonale, chronic (CMS/HCC)   • Oropharyngeal dysphagia   • Cervical pain   • Acute shoulder pain          Visit Dx:    ICD-10-CM ICD-9-CM   1. 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 Injury of head, sequela  S09.90XS 908.9       SLP [facility] Evaluation - 21 0950        Standardized Tests    Cognitive/Memory Tests  SLUMS: [facility] Mental Status Examination   -AD       SLUMS: [facility] Mental Status Examination    SLUMS Score  22   -AD    SLUMS Range  21-26: Mild Neurocognitive Disorder (High school education or higher)   -AD      User Key  (r) = Recorded By, (t) = Taken By, (c) = Cosigned By    Initials Name Provider Type    AD Elke Schwartz MS CCC-SLP Speech and Language Pathologist            SLP OP Goals     Row Name 21 0950          Goal Type Needed    Goal Type Needed  Executive Function;Memory;Other Adult Goals  -AD        Subjective Comments    Subjective Comments  Pt was seen in therapy for 55 minutes and was alert and cooperative. States he had to stay in bed all weekend again and cannot come up with what causes him to be so symptomatic.   -AD        Subjective Pain    Able to rate subjective pain?  yes  -AD     Pre-Treatment Pain Level  2  -AD     Post-Treatment Pain Level  4  -AD     Subjective Pain Comment  Pt reports headache increasing. Feels distraction of rain dripping around window causing issues.   -AD        Executive Function Goals    Executive Function LTG's   Patient will be able to use high level cognitive skills to allow patient to return to work  -AD     Patient will be able to use high level cognitive skills to allow patient to return to work  90%:;without cues  -AD     Status: Patient will be able to use high level cognitive skills to allow patient to return to work  Progressing as expected  -AD     Comments: Patient will be able to use high level cognitive skills to allow patient to return to work  Pt able to demonstrate overall appropriate use of executive functions of planning, persistence and completion of all activities on 80% of trials with inconsistent verbal prompts and cues.   -AD     Patient will improve executive functioning skills by identifying the tasks and problems where impairments interfere  90%:;without cues  -AD     Status: Patient will improve executive functioning skills by identifying the tasks and problems where impairments interfere  Progressing as expected  -AD     Comments: Patient will improve executive functioning skills by identifying the tasks and problems where impairments interfere  Pt able to report that he demonstrates difficulty with being able to concentrate on a problem and solve in his head without taking extended time to perform as well as some trial and error due to not being able tocalculate information quickly and accurately. Pt able to demonstrate the ability to hold information and perform simple direct calculations without issue. Able to perform at 80% and minimal verbal prompts.   -AD     Patient will improve executive functioning skills by identifying and describing the nature of the executive dysfunction  90%:;without cues  -AD     Status: Patient will improve executive functioning skills by identifying and describing the nature of the executive dysfunction  Progressing as expected  -AD     Comments: Patient will improve executive functioning skills by identifying and describing the nature of the executive dysfunction   Pt able to identify that he is easily distracted by sounds and is able to adjust his positioning to decrease sounds and improve focus on the task. He demonstrates decreased awareness of his ability to focus and use strategies to aid with recall and attention to a particular tasks.   -AD     Patient will improve executive functioning skills by using planning strategies prior to beginning tasks  90%:;without cues  -AD     Status: Patient will improve executive functioning skills by using planning strategies prior to beginning tasks  Progress slower than expected  -AD     Comments: Patient will improve executive functioning skills by using planning strategies prior to beginning tasks  Pt with mixed planning noted during the session. Improved focus and planning on visuospatial tasks noted but decreased planning noted on selective and alternating attention tasks.   -AD        Memory Goals    Patient will be able to remember information needed to return to work and function on work-related tasks  90%:;without cues  -AD     Status: Patient will be able to remember information needed to return to work and function on work-related tasks  Progress slower than expected  -AD     Patient’s memory skills will be enhanced as reported by patient by utilizing internal memory strategies to recall up to 3 pieces of information after a 5- minute delay  90%:;without cues 5-7 pieces of information  -AD     Status: Patient’s memory skills will be enhanced as reported by patient by utilizing internal memory strategies to recall up to 3 pieces of information after a 5- minute delay  Progress slower than expected  -AD     Comments: Patient’s memory skills will be enhanced as reported by patient by utilizing internal memory strategies to recall up to 3 pieces of information after a 5- minute delay  Pt was able to demonstrate recall of responses to questions when provided with a second, distracting tasks. He was able to demonstrate recall of  past responses from 1-2 questions prior but not to the immediate response. When provided with repetition of remember this response prior to the second distracting task, he was able to demonstrate improved recall. Pt was unable to demonstrate spontaneous use of this task even when he reports that it is helpful.   -AD        Other Goals    Other Adult Goal- 2  Pt will improve executive function skills by using a self-instructional technique and meta-cognitive strategies (e.g. planning, monitoring) before, during and after tasks in order to complete familiar tasks on 90% of trials without cues.   -AD     Status: Other Adult Goal- 2  Progress slower than expected  -AD     Comments: Other Adult Goal- 2  Pt continues to require verbal prompts and cues in order to use planning strategies for memory tasks and focus. He does not demonstrate carryover or use with fading of cues.   -AD     Other Adult Goal- 3  Pt will improve executive function skills by using a self-instructional technique and meta-cognitive strategies (e.g. planning, monitoring) before, during and after tasks in order to complete novel tasks on 90% of trials without cues.   -AD     Status: Other Adult Goal- 3  New  -AD     Other Adult Goal- 4  Pt will be able to report s/s of post concussion with increased cognitive and physical activity and report techniques to aid with increasing activity w/o symptoms in order to improve overall cognitive functioning over 3 sessions w/o cues.   -AD     Status: Other Adult Goal- 4  Progressing as expected  -AD     Comments: Other Adult Goal- 4  Pt is able to report increases in symptomology of post concussion. He demonstrates abiltiy to perform cognitive tasks without significant increase in symptoms, but continues to report synmptoms present at home requiring him to go to bed or stay in a dark, quiet room. Pt unable to state what preciptates these events.   -AD        SLP Time Calculation    SLP Goal Re-Cert Due Date   02/18/21  -AD       User Key  (r) = Recorded By, (t) = Taken By, (c) = Cosigned By    Initials Name Provider Type    Elke Mattson MS CCC-SLP Speech and Language Pathologist          OP SLP Education     Row Name 01/25/21 0950       Education    Learning Method  Explanation;Demonstration;Teach back  -AD    Teaching Response  Verbalized understanding;Demonstrated understanding;Reinforcement needed  -AD      User Key  (r) = Recorded By, (t) = Taken By, (c) = Cosigned By    Initials Name Effective Dates    Elke Mattson MS CCC-SLP 08/09/20 -           OP SLP Assessment/Plan - 01/25/21 0950        SLP Assessment    Clinical Impression Comments  Pt demonstrates no significant changes from last session. Decreased motivation and some concerns for depression over his current situation noted. Results from [facility] SLUMS continue to indicated mild neurocognitive impairment with errrors in math calculations (verbal working memory and attention), repetition of divergent naming items but enough items named, decreased recall of objects named (2/5) and decreased recall of information read to him from a paragraph (6/8 points). Pt continues to demonstrate delayed recall deficits and struggles to implement self cuing/use of internal memory strategies.    -AD    SLP Diagnosis  Mild cognitive communication impairment.    -AD       SLP Plan    Plan Comments  Will continue with therapy next week and continue to reinforce deficits and need for continued use of taught strategies to aid in cognitive participation.    -AD      User Key  (r) = Recorded By, (t) = Taken By, (c) = Cosigned By    Initials Name Provider Type    Elke Mattson MS CCC-SLP Speech and Language Pathologist                 Time Calculation:   SLP Start Time: 0855  SLP Stop Time: 0950  SLP Time Calculation (min): 55 min  SLP Non-Billable Time (min): 0 min  Total Timed Code Minutes- SLP: 55 minute(s)    Therapy Charges for Today     Code Description  Service Date Service Provider Modifiers Qty    27509746471  ST DEV OF COGN SKILLS INITIAL 15 MIN 1/25/2021 Elke Schwartz, MS CCC-SLP GN 1    [id number]  ST DEV OF COGN SKILLS EACH ADDT'L 15 MIN 1/25/2021 Elke Schwartz, MS CCC-SLP GN 3                   Elke Schwartz, MS CCC-SLP  1/25/2021

## 2021-02-01 ENCOUNTER — HOSPITAL ENCOUNTER (OUTPATIENT)
Dept: SPEECH THERAPY | Facility: HOSPITAL | Age: 61
Setting detail: THERAPIES SERIES
Discharge: HOME | End: 2021-02-01

## 2021-02-01 DIAGNOSIS — F06.8 COGNITIVE DEFICIT AS LATE EFFECT OF TRAUMATIC BRAIN INJURY (HCC): Primary | ICD-10-CM

## 2021-02-01 DIAGNOSIS — S06.9X0S COGNITIVE DEFICIT AS LATE EFFECT OF TRAUMATIC BRAIN INJURY (HCC): Primary | ICD-10-CM

## 2021-02-01 DIAGNOSIS — S09.90XS INJURY OF HEAD, SEQUELA: ICD-10-CM

## 2021-02-01 PROCEDURE — 97130 THER IVNTJ EA ADDL 15 MIN: CPT

## 2021-02-01 PROCEDURE — 97129 THER IVNTJ 1ST 15 MIN: CPT

## 2021-02-01 NOTE — THERAPY TREATMENT NOTE
Outpatient Speech Language Pathology   Adult Speech Language Cognitive Treatment Note  DONYA Art     Patient Name: Michael Lopez  : 1960  MRN: [id number]  Today's Date: 2021         Visit Date: 2021   Patient Active Problem List   Diagnosis   • Hypertension   • Neuritis or radiculitis due to rupture of lumbar intervertebral disc   • Snoring   • Essential hypertension, malignant   • Obstructive sleep apnea, adult   • Cor pulmonale, chronic (CMS/HCC)   • Oropharyngeal dysphagia   • Cervical pain   • Acute shoulder pain          Visit Dx:    ICD-10-CM ICD-9-CM   1. 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 Injury of head, sequela  S09.90XS 908.9         SLP OP Goals     Row Name 21 0855          Goal Type Needed    Goal Type Needed  Executive Function;Memory;Other Adult Goals  -AD        Subjective Comments    Subjective Comments  Pt was seen in therapy for 50 minutes and was alert and cooperative. He reports he is doing subjectively better and that he has found out that he has 'post concussion syndrome' and has been reading about it on the internet. SLP has discussed with patient on prior visits as well.   -AD        Subjective Pain    Able to rate subjective pain?  yes  -AD     Pre-Treatment Pain Level  2  -AD     Post-Treatment Pain Level  3  -AD     Subjective Pain Comment  Pt reports a headache at Level 2 upon start of therapy. Moved to a Level 4 during focused, high level activities. He reports it subsided at the end with general conversation to a Level 3.   -AD        Executive Function Goals    Executive Function LTG's  Patient will be able to use high level cognitive skills to allow patient to return to work  -AD     Patient will be able to use high level cognitive skills to allow patient to return to work  90%:;without cues  -AD     Status: Patient will be able to use high level cognitive skills to allow patient to return  to work  Progressing as expected  -AD     Comments: Patient will be able to use high level cognitive skills to allow patient to return to work  Pt able to perform high level activities during the session, but requires inconsistent verbal prompts and cues to use strategies and planning in order to complete efficiently. Able to perform on his own with 70% and up to 90% with verbal prmpts and cues.   -AD     Patient will improve executive functioning skills by identifying the tasks and problems where impairments interfere  90%:;without cues  -AD     Status: Patient will improve executive functioning skills by identifying the tasks and problems where impairments interfere  Progressing as expected  -AD     Comments: Patient will improve executive functioning skills by identifying the tasks and problems where impairments interfere  Pt able to demonstrate understanding of difficulty with recall tasks without use of strategies and self monitoring. He did not demonstrate understanding of issues related to planning. He was able to demonstrate during task when SLP providing with prompts for use of strategies. He was able to follow through and use the strategies for planning with minimal inconsistent cues.   -AD     Patient will improve executive functioning skills by identifying and describing the nature of the executive dysfunction  90%:;without cues  -AD     Status: Patient will improve executive functioning skills by identifying and describing the nature of the executive dysfunction  Progressing as expected  -AD     Comments: Patient will improve executive functioning skills by identifying and describing the nature of the executive dysfunction  Pt demonstrates better understanding of his deficits as it is related to his 'post concussion syndrome' and demonstrates better understanding of need for use of internal strategies to aid with recall. He was able to use these strategies on 100% of trials r/t recall today with good  results.   -AD     Patient will improve executive functioning skills by using planning strategies prior to beginning tasks  90%:;without cues  -AD     Status: Patient will improve executive functioning skills by using planning strategies prior to beginning tasks  Progressing as expected  -AD     Comments: Patient will improve executive functioning skills by using planning strategies prior to beginning tasks  Pt was able to use planning strategies with initial instruction by SLP and inconsistent verbal prompts for continued use. He was able to demonstrate independent use on 75% of the activity.   -AD        Memory Goals    Patient will be able to remember information needed to return to work and function on work-related tasks  90%:;without cues  -AD     Status: Patient will be able to remember information needed to return to work and function on work-related tasks  Progressing as expected  -AD     Comments: Patient will be able to remember information needed to return to work and function on work-related tasks  Pt demonstrates improved recall of details and recent activities without cues. He demonstrates improved recall of unrelated items with use of internal strategies without cues. 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 He reports improved recall of job related tasks that he is able to perform at home.   -AD     Patient’s memory skills will be enhanced as reported by patient by utilizing internal memory strategies to recall up to 3 pieces of information after a 5- minute delay  90%:;without cues 5-7 pieces of information  -AD     Status: Patient’s memory skills will be enhanced as reported by patient by utilizing internal memory strategies to recall up to 3 pieces of information after a 5- minute delay  Progressing as expected  -AD     Comments: Patient’s memory skills will be enhanced as reported by patient by utilizing internal memory strategies to  recall up to 3 pieces of information after a 5- minute delay  Pt was able to use internal memory strategies to recall 5 unrelated items on 87% of trials without cues.   -AD        Other Goals    Other Adult Goal- 2  Pt will improve executive function skills by using a self-instructional technique and meta-cognitive strategies (e.g. planning, monitoring) before, during and after tasks in order to complete familiar tasks on 90% of trials without cues.   -AD     Status: Other Adult Goal- 2  Progressing as expected  -AD     Comments: Other Adult Goal- 2  Pt was able to use a self instructional technique in order to recall items in reverse order on 80% of trials. He demonstrated awareness of errors at times and was able to self correct on his own with extended time.   -AD     Other Adult Goal- 3  Pt will improve executive function skills by using a self-instructional technique and meta-cognitive strategies (e.g. planning, monitoring) before, during and after tasks in order to complete novel tasks on 90% of trials without cues.   -AD     Status: Other Adult Goal- 3  Progressing as expected  -AD     Comments: Other Adult Goal- 3  Pt was able to complete a novel high level cognitive task with initial instruction at 100% but with minimal inconsistent verbal prompts throughout the task.   -AD     Other Adult Goal- 4  Pt will be able to report s/s of post concussion with increased cognitive and physical activity and report techniques to aid with increasing activity w/o symptoms in order to improve overall cognitive functioning over 3 sessions w/o cues.   -AD     Status: Other Adult Goal- 4  Achieved  -AD     Comments: Other Adult Goal- 4  Pt is able to consistently report symptoms and when symptoms increase. He is able to demonstrate backing off of the tasks for symptom management and return to the task w/decrease in symptoms in order to complete.   -AD        SLP Time Calculation    SLP Goal Re-Cert Due Date  02/18/21  -AD        User Key  (r) = Recorded By, (t) = Taken By, (c) = Cosigned By    Initials Name Provider Type    Elke Mattson MS CCC-SLP Speech and Language Pathologist          OP SLP Education     Row Name 02/01/21 0855       Education    Learning Method  Explanation;Demonstration;Teach back  -AD    Teaching Response  Verbalized understanding;Demonstrated understanding  -AD    Education Comments  Pt able to demonstrate use of internal strategies and utilize. He is demonstrating increased use of planning strategies with minimal inconsistent cues. Symptom management is independent at home per report of patient after questions from SLP. Pt to continue with work related activities at home as able and use planning and self monitoring strategies. He verbalizes understanding.   -AD      User Key  (r) = Recorded By, (t) = Taken By, (c) = Cosigned By    Initials Name Effective Dates    Elke Mattson MS CCC-SLP 08/09/20 -           OP SLP Assessment/Plan - 02/01/21 0855        SLP Assessment    Clinical Impression Comments  Pt is demonstrating progress towards his functional, work related goals. He is demonstrating increased spontaneous use of strategies and is responding to verbal cues/prompts in order to improve performance. He demonstrates achievement of his goal r/t symptom awareness and management and is verbalizing better understanding of his disease process in order to manage it on his own.    -AD    SLP Diagnosis  Mild cognitive communication impairment.    -AD       SLP Plan    Plan Comments  Will continue with therapy for one more visit and make further recommendations at that time.    -AD      User Key  (r) = Recorded By, (t) = Taken By, (c) = Cosigned By    Initials Name Provider Type    Elke Mattson MS CCC-SLP Speech and Language Pathologist                 Time Calculation:   SLP Start Time: 0805  SLP Stop Time: 0855  SLP Time Calculation (min): 50 min  SLP Non-Billable Time (min): 0  min  Total Timed Code Minutes- SLP: 50 minute(s)    Therapy Charges for Today     Code Description Service Date Service Provider Modifiers Qty    60844862449 HC ST DEV OF COGN SKILLS INITIAL 15 MIN 2/1/2021 Elke Schwartz, MS CCC-SLP GN 1    [id number] HC ST DEV OF COGN SKILLS EACH ADDT'L 15 MIN 2/1/2021 Elke Schwartz, MS CCC-SLP GN 2                   Elke Schwartz, MS CCC-SLP  2/1/2021

## 2021-02-08 ENCOUNTER — APPOINTMENT (OUTPATIENT)
Dept: SPEECH THERAPY | Facility: HOSPITAL | Age: 61
End: 2021-02-08

## 2021-02-15 ENCOUNTER — APPOINTMENT (OUTPATIENT)
Dept: SPEECH THERAPY | Facility: HOSPITAL | Age: 61
End: 2021-02-15

## 2021-02-22 ENCOUNTER — HOSPITAL ENCOUNTER (OUTPATIENT)
Dept: SPEECH THERAPY | Facility: HOSPITAL | Age: 61
Setting detail: THERAPIES SERIES
Discharge: HOME | End: 2021-02-22

## 2021-02-22 DIAGNOSIS — S06.9X0S COGNITIVE DEFICIT AS LATE EFFECT OF TRAUMATIC BRAIN INJURY (HCC): Primary | ICD-10-CM

## 2021-02-22 DIAGNOSIS — F06.8 COGNITIVE DEFICIT AS LATE EFFECT OF TRAUMATIC BRAIN INJURY (HCC): Primary | ICD-10-CM

## 2021-02-22 DIAGNOSIS — S09.90XS INJURY OF HEAD, SEQUELA: ICD-10-CM

## 2021-02-22 PROCEDURE — 97130 THER IVNTJ EA ADDL 15 MIN: CPT

## 2021-02-22 PROCEDURE — 97129 THER IVNTJ 1ST 15 MIN: CPT

## 2021-02-22 NOTE — THERAPY DISCHARGE NOTE
Outpatient Speech Language Pathology   Adult Speech Language Cognitive Treatment Note/Discharge Summary  DONYA Art     Patient Name: Michael Lopez  : 1960  MRN: [id number]  Today's Date: 2021         Visit Date: 2021   Patient Active Problem List   Diagnosis   • Hypertension   • Neuritis or radiculitis due to rupture of lumbar intervertebral disc   • Snoring   • Essential hypertension, malignant   • Obstructive sleep apnea, adult   • Cor pulmonale, chronic (CMS/HCC)   • Oropharyngeal dysphagia   • Cervical pain   • Acute shoulder pain          Visit Dx:    ICD-10-CM ICD-9-CM   1. 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 Injury of head, sequela  S09.90XS 908.9       SLP SLC Evaluation - 21 0954        Communication Assessment/Intervention    Document Type  re-evaluation   -AD    Total Evaluation Minutes, SLP  44   -AD    Subjective Information  no complaints   -AD    Patient Observations  alert;cooperative;agree to therapy   -AD    Patient/Family/Caregiver Comments/Observations  Pt seen upright in a chair in SLP office. States he has been feeling better overall and reports symptoms related to light and sound sensitivity have resolved.    -AD    Care Plan Review  evaluation/treatment results reviewed;patient/other agree to care plan;care plan/treatment goals reviewed;risks/benefits reviewed;current/potential barriers reviewed   -AD    Patient Effort  excellent   -AD    Symptoms Noted During/After Treatment  increased pain;other (see comments)    mild headache increasing during the session  -AD       General Information    Patient Profile Reviewed  yes   -AD    Pertinent History Of Current Problem  Pt is a 61 y/o male who has attended therapy for 7 visit including the initial evaluation. Reassessment via the CLQT targeted to assess further need for continued therapy. No changes in history. MRI on 21 with the following  impressions: 'Stable findings when compared to prior study of 11/12/2020. There is no convincing evidence for stigmata of acute or chronic traumatic brain injury. Relatively minor and stable changes of chronic small vessel ischemic phenomena are incidentally noted.'   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 and a technical degree   -AD    Prior Level of Function-Communication  WFL;other (see comments)    Pt does report prior head injuries greater than 21 yrs ago  -AD    Plans/Goals Discussed with  patient;agreed upon   -AD    Barriers to Rehab  previous functional deficit    possible mild deficits from prior head injuries reported  -AD    Patient's Goals for Discharge  functional cognition;return to work;return to all previous roles/activities   -AD    Standardized Assessment Used  CLQT   -AD       Pain    Additional Documentation  Pain Scale: Numbers Pre/Post-Treatment (Group)   -AD       Pain Scale: Numbers Pre/Post-Treatment    Pretreatment Pain Rating  2/10   -AD    Posttreatment Pain Rating  4/10   -AD    Pain Location - Side  Bilateral   -AD    Pain Location  head   -AD    Pre/Posttreatment Pain Comment  Headache increasing with cognitive tasks.   -AD       Standardized Tests    Cognitive/Memory Tests  CLQT: Cognitive Linguistic Quick Test   -AD       CLQT (The Cognitive Linguistic Quick Test)    Attention Domain Score  202   -AD    Attention Severity Rating  4: WNL   -AD    Memory Domain Score  172   -AD    Memory Severity Rating  4: WNL   -AD    Executive Function Domain Score  29   -AD    Executive Function Severity Rating  4: WNL   -AD    Language Domain Score  34   -AD    Language Severity Rating  4: WNL   -AD    Visuospatial Domain Score  91   -AD    Visuospatial Severity Rating  4: WNL   -AD    Clock Drawing Total Score  13   -AD    Clock Drawing  Severity Rating  WNL   -AD    Composite Severity Rating  4   -AD    Composite Severity Rating Range  4.0 - 3.5: WNL   -AD    CLQT Comments  Pt presents with functional cognitive communication skills with all domains (Attention, Memory, Executive Functions, Language and Visuospatial Skills WNL. Improvement in all scores noted from prior assessment on 12/18/20. Pt demonstrates all cut off scores tor his age range to be at or above level. Prior deficits in Mazes and Design Generation were improved. Pt demonstrates appropriate planning, organization, recall of related information and visuospatial recognition and performance are WFL. Less frustration and anxiety noted as well during tasks. Pt utilized strategies of self talk to aid in performance.    -AD       SLP Clinical Impressions    SLP Diagnosis  Functional cognitive communication skills.   -AD    SLC Criteria for Skilled Therapy Interventions Met  no problems identified which require skilled intervention;other (see comments)    no further therapy indicated at this time  -AD    Functional Impact  other (see comments)    no functional impact, but endurance may be limited  -AD    Plan for Continued Treatment (SLP)  No further therapy is indicated at this time. Pt reports his performance at home is much improved and cognitive performance in therapy has significantly improved. Some limitation with regards to endurance over a long period of time due to mild symptoms still present with continued/persistent activity.    -AD       Recommendations    Anticipated Discharge Disposition (SLP)  home   -AD    Patient/Family Concerns, Anticipated Discharge Disposition (SLP)  Pt does not voice any concerns at this time.    -AD      User Key  (r) = Recorded By, (t) = Taken By, (c) = Cosigned By    Initials Name Provider Type    Elke Mattson, MS CCC-SLP Speech and Language Pathologist        CLQT Results of 12/18/2021 on Initial Evaluation:    Standardized Tests       Cognitive/Memory Tests  CLQT: Cognitive Linguistic Quick Test   -AD               CLQT (The Cognitive Linguistic Quick Test)     Attention Domain Score  184   -AD     Attention Severity Rating  4: WNL   -AD     Memory Domain Score  159   -AD     Memory Severity Rating  4: WNL   -AD     Executive Function Domain Score  23   -AD     Executive Function Severity Rating  3: Mild   -AD     Language Domain Score  31   -AD     Language Severity Rating  4: WNL   -AD     Visuospatial Domain Score  80   -AD     Visuospatial Severity Rating  3: Mild   -AD     Clock Drawing Total Score  12   -AD     Clock Drawing Severity Rating  WNL   -AD     Composite Severity Rating  3.6   -AD     Composite Severity Rating Range  4.0 - 3.5: WNL   -AD     CLQT Comments  Pt presents with functional overall cognitive communication skills but mild deficits noted in the domains of Executive Functions and Visuospatial Skills. He demonstrates below level task scores compared to cut scores for his age range (18-69) in Mazes, and Design Generation. Task scores were at Cut score levels on Personal Facts (highest level achieveable), Confrontation Naming (highest level achieveable), Clock Drawing (12 out of 13 points achieved), Generative Naming (5 out of a possible 9) and Design Memory (5 out of a possible 6). He demonstrated greatest difficulty with tasks involving visuospatial organization, working memory (visual memory, visual memory manipulation, spatial memory) and mental flexibility.    -AD         SLP OP Goals     Row Name 02/22/21 0934          Goal Type Needed    Goal Type Needed  Executive Function;Memory;Other Adult Goals  -AD        Subjective Pain    Able to rate subjective pain?  yes  -AD     Pre-Treatment Pain Level  2  -AD     Post-Treatment Pain Level  4  -AD     Subjective Pain Comment  Headache increasing during reassessment.  -AD        Executive Function Goals    Executive Function LTG's  Patient will be able to use high level  cognitive skills to allow patient to return to work  -AD     Patient will be able to use high level cognitive skills to allow patient to return to work  90%:;without cues  -AD     Status: Patient will be able to use high level cognitive skills to allow patient to return to work  Achieved  -AD     Comments: Patient will be able to use high level cognitive skills to allow patient to return to work  Per observation and results of reassessment, pt demonstrates functional excecutive function skills for return to work. Minimal concerns related to increased symptoms during activity and may not be able to sustain performance over an entire shift at this time. Pt understanding. Will await clearance from MD/workers compensation. Goal met.  -AD     Patient will improve executive functioning skills by identifying the tasks and problems where impairments interfere  90%:;without cues  -AD     Status: Patient will improve executive functioning skills by identifying the tasks and problems where impairments interfere  Achieved  -AD     Comments: Patient will improve executive functioning skills by identifying the tasks and problems where impairments interfere  Pt demonstrates appropriate planning execution of high level tasks during CLQT. Goal met and no further needs identified.   -AD     Patient will improve executive functioning skills by identifying and describing the nature of the executive dysfunction  90%:;without cues  -AD     Status: Patient will improve executive functioning skills by identifying and describing the nature of the executive dysfunction  Achieved  -AD     Comments: Patient will improve executive functioning skills by identifying and describing the nature of the executive dysfunction  Pt is able to demonstrate understanding of symptoms that affect cognitive performance and reports backing off of acivity at home to allow for improvement in symptoms. Goal met.   -AD     Patient will improve executive functioning  skills by using planning strategies prior to beginning tasks  90%:;without cues  -AD     Status: Patient will improve executive functioning skills by using planning strategies prior to beginning tasks  Achieved  -AD     Comments: Patient will improve executive functioning skills by using planning strategies prior to beginning tasks  Pt demonstrates appropriate planning skills when encountering high level cognitive tasks without cues per CLQT performance. Goal met.   -AD        Memory Goals    Patient will be able to remember information needed to return to work and function on work-related tasks  90%:;without cues  -AD     Status: Patient will be able to remember information needed to return to work and function on work-related tasks  Achieved  -AD     Comments: Patient will be able to remember information needed to return to work and function on work-related tasks  Pt demonstrate functional recall of related information and demonstrates the ability to recall information related to home and work related tasks. Pt continues to report that he is able to demonstrate performance on some work related tasks at home but with limitations related to symptom management. Goal met.   -AD     Patient will demonstrate improved ability to recall information by immediately recalling a series of words  90%:;unrelated;with no delay;without cues  -AD     Status: Patient will demonstrate improved ability to recall information by immediately recalling a series of words  Achieved 1/20/21  -AD     Comments: Patient will demonstrate improved ability to recall information by immediately recalling a series of words  Pt is able to recall unrelated items immediately  on 100% of trials.   -AD     Patient’s memory skills will be enhanced as reported by patient by utilizing internal memory strategies to recall up to 3 pieces of information after a 5- minute delay  90%:;without cues 5-7 pieces of information over 30 minute time span  -AD      Status: Patient’s memory skills will be enhanced as reported by patient by utilizing internal memory strategies to recall up to 3 pieces of information after a 5- minute delay  Discontinued  -AD     Comments: Patient’s memory skills will be enhanced as reported by patient by utilizing internal memory strategies to recall up to 3 pieces of information after a 5- minute delay  Pt was able to demonstrate recall of 5 items after delay at 87% without cues. Goal was discontinued due to normal functioning on CLQT/reassessment.   -AD        Other Goals    Other Adult Goal- 1  Pt will participate in further evaluation of memory skills for further goal planning in 2 weeks  -AD     Status: Other Adult Goal- 1  Achieved 12/29/20  -AD     Comments: Other Adult Goal- 1  Completed the RBANS form a for further memory testing. See results.   -AD     Other Adult Goal- 2  Pt will improve executive function skills by using a self-instructional technique and meta-cognitive strategies (e.g. planning, monitoring) before, during and after tasks in order to complete familiar tasks on 90% of trials without cues.   -AD     Status: Other Adult Goal- 2  Achieved  -AD     Comments: Other Adult Goal- 2  Pt was able to use strategy of self talk and repetition to aid with perform on all higher level cognitive tasks. Goal met.   -AD     Other Adult Goal- 3  Pt will improve executive function skills by using a self-instructional technique and meta-cognitive strategies (e.g. planning, monitoring) before, during and after tasks in order to complete novel tasks on 90% of trials without cues.   -AD     Status: Other Adult Goal- 3  Achieved  -AD     Comments: Other Adult Goal- 3  Pt able to demonstrate spontaneous performance of planning and monitoring on high level cognitive tasks. Goal met.   -AD     Other Adult Goal- 4  Pt will be able to report s/s of post concussion with increased cognitive and physical activity and report techniques to aid with  increasing activity w/o symptoms in order to improve overall cognitive functioning over 3 sessions w/o cues.   -AD     Status: Other Adult Goal- 4  Achieved 2/1/21  -AD     Comments: Other Adult Goal- 4  Pt is able to consistently report symptoms and when symptoms increase. He is able to demonstrate backing off of the tasks for symptom management and return to the task w/decrease in symptoms in order to complete.   -AD        SLP Time Calculation    SLP Goal Re-Cert Due Date  02/18/21  -AD       User Key  (r) = Recorded By, (t) = Taken By, (c) = Cosigned By    Initials Name Provider Type    Elke Mattson MS CCC-SLP Speech and Language Pathologist          OP SLP Education     Row Name 02/22/21 0934       Education    Barriers to Learning  No barriers identified  -AD    Education Provided  Described results of evaluation;Patient expressed understanding of evaluation  -AD    Assessed  Learning needs;Learning motivation;Learning preferences;Learning readiness  -AD    Learning Motivation  Strong  -AD    Learning Method  Explanation;Demonstration  -AD    Teaching Response  Verbalized understanding;Demonstrated understanding  -AD    Education Comments  Pt able to demonstrate understanding of reassessment results and in agreement with  no further therapy at this time. Demonstrates understanding of symptom awarenss and ability to back off on activity and resume based on symptoms.   -AD      User Key  (r) = Recorded By, (t) = Taken By, (c) = Cosigned By    Initials Name Effective Dates    Elke Mattson MS CCC-SLP 08/09/20 -           OP SLP Assessment/Plan - 02/22/21 0934        SLP Assessment    Functional Problems  Speech Language- Adult/Cognition   -AD    Impact on Function: Adult Speech Language/Cognition  Other (comment0    No significant impact on function at this time.  -AD    Clinical Impression: Speech Language-Adult/Congnition  Cognitive Communication WFL   -AD    Clinical Impression Comments  Pt  demonstrates all domains to be WNL on the CLQT reassessment. Pt able to demonstrate achievement of all goals related to safety and function. He continues to report some symptoms of dizziness and nausea as well as persistent headaches. He reports that he no longer has symptoms of sound or light sensitivity. He is able to demonstrate independent performance on all cognitive activities in therapy and related to work functions. Some limitations in endurance due to continued symptoms.    -AD    Please refer to items scanned into chart for additional diagnostic informaiton and handouts as provided by clinician  Yes   -AD    SLP Diagnosis  Functional cognitive communication skills   -AD       SLP Plan    Plan Comments  No further therapy indicated at this time due to functional performance.    -AD      User Key  (r) = Recorded By, (t) = Taken By, (c) = Cosigned By    Initials Name Provider Type    AD Elke Schwartz MS CCC-SLP Speech and Language Pathologist             Time Calculation:   SLP Start Time: 0850  SLP Stop Time: 0934  SLP Time Calculation (min): 44 min  SLP Non-Billable Time (min): 0 min  Total Timed Code Minutes- SLP: 44 minute(s)    Therapy Charges for Today     Code Description Service Date Service Provider Modifiers Qty    24867914094 HC ST DEV OF COGN SKILLS INITIAL 15 MIN 2/22/2021 Elke Schwartz MS CCC-SLP GN 1    95911591306 HC ST DEV OF COGN SKILLS EACH ADDT'L 15 MIN 2/22/2021 Elke Schwartz MS CCC-SLP GN 2               Elke Schwartz MS CCC-SLP  2/22/2021

## 2021-03-08 ENCOUNTER — HOSPITAL ENCOUNTER (EMERGENCY)
Facility: HOSPITAL | Age: 61
Discharge: HOME | End: 2021-03-08
Attending: EMERGENCY MEDICINE | Admitting: EMERGENCY MEDICINE

## 2021-03-08 VITALS
BODY MASS INDEX: 42.66 KG/M2 | OXYGEN SATURATION: 99 % | DIASTOLIC BLOOD PRESSURE: 74 MMHG | HEIGHT: 72 IN | WEIGHT: 315 LBS | HEART RATE: 66 BPM | SYSTOLIC BLOOD PRESSURE: 124 MMHG | TEMPERATURE: 98.6 F | RESPIRATION RATE: 16 BRPM

## 2021-03-08 DIAGNOSIS — M62.838 MUSCLE SPASM OF LEFT LOWER EXTREMITY: Primary | ICD-10-CM

## 2021-03-08 LAB
ALBUMIN SERPL-MCNC: 3.4 G/DL (ref 3.5–5.2)
ALBUMIN/GLOB SERPL: 1.2 G/DL
ALP SERPL-CCNC: 88 U/L (ref 39–117)
ALT SERPL W P-5'-P-CCNC: 22 U/L (ref 1–41)
ANION GAP SERPL CALCULATED.3IONS-SCNC: 8.8 MMOL/L (ref 5–15)
AST SERPL-CCNC: 17 U/L (ref 1–40)
BASOPHILS # BLD AUTO: 0.03 10*3/MM3 (ref 0–0.2)
BASOPHILS NFR BLD AUTO: 0.4 % (ref 0–1.5)
BILIRUB SERPL-MCNC: <0.2 MG/DL (ref 0–1.2)
BUN SERPL-MCNC: 9 MG/DL (ref 6–20)
BUN/CREAT SERPL: 8.3 (ref 7–25)
CALCIUM SPEC-SCNC: 8.8 MG/DL (ref 8.6–10.5)
CHLORIDE SERPL-SCNC: 102 MMOL/L (ref 98–107)
CO2 SERPL-SCNC: 26.2 MMOL/L (ref 22–29)
CREAT SERPL-MCNC: 1.08 MG/DL (ref 0.76–1.27)
DEPRECATED RDW RBC AUTO: 39.5 FL (ref 37–54)
EOSINOPHIL # BLD AUTO: 0.37 10*3/MM3 (ref 0–0.4)
EOSINOPHIL NFR BLD AUTO: 5.3 % (ref 0.3–6.2)
ERYTHROCYTE [DISTWIDTH] IN BLOOD BY AUTOMATED COUNT: 13 % (ref 12.3–15.4)
GFR SERPL CREATININE-BSD FRML MDRD: 70 ML/MIN/1.73
GLOBULIN UR ELPH-MCNC: 2.9 GM/DL
GLUCOSE SERPL-MCNC: 147 MG/DL (ref 65–99)
HCT VFR BLD AUTO: 41 % (ref 37.5–51)
HGB BLD-MCNC: 13.7 G/DL (ref 13–17.7)
IMM GRANULOCYTES # BLD AUTO: 0.05 10*3/MM3 (ref 0–0.05)
IMM GRANULOCYTES NFR BLD AUTO: 0.7 % (ref 0–0.5)
LYMPHOCYTES # BLD AUTO: 2.16 10*3/MM3 (ref 0.7–3.1)
LYMPHOCYTES NFR BLD AUTO: 31.2 % (ref 19.6–45.3)
MCH RBC QN AUTO: 28.2 PG (ref 26.6–33)
MCHC RBC AUTO-ENTMCNC: 33.4 G/DL (ref 31.5–35.7)
MCV RBC AUTO: 84.4 FL (ref 79–97)
MONOCYTES # BLD AUTO: 0.54 10*3/MM3 (ref 0.1–0.9)
MONOCYTES NFR BLD AUTO: 7.8 % (ref 5–12)
NEUTROPHILS NFR BLD AUTO: 3.78 10*3/MM3 (ref 1.7–7)
NEUTROPHILS NFR BLD AUTO: 54.6 % (ref 42.7–76)
NRBC BLD AUTO-RTO: 0 /100 WBC (ref 0–0.2)
PLATELET # BLD AUTO: 212 10*3/MM3 (ref 140–450)
PMV BLD AUTO: 9.4 FL (ref 6–12)
POTASSIUM SERPL-SCNC: 3.5 MMOL/L (ref 3.5–5.2)
PROT SERPL-MCNC: 6.3 G/DL (ref 6–8.5)
RBC # BLD AUTO: 4.86 10*6/MM3 (ref 4.14–5.8)
SODIUM SERPL-SCNC: 137 MMOL/L (ref 136–145)
WBC # BLD AUTO: 6.93 10*3/MM3 (ref 3.4–10.8)

## 2021-03-08 PROCEDURE — 99283 EMERGENCY DEPT VISIT LOW MDM: CPT | Performed by: EMERGENCY MEDICINE

## 2021-03-08 PROCEDURE — 99283 EMERGENCY DEPT VISIT LOW MDM: CPT

## 2021-03-08 PROCEDURE — 85025 COMPLETE CBC W/AUTO DIFF WBC: CPT | Performed by: EMERGENCY MEDICINE

## 2021-03-08 PROCEDURE — 80053 COMPREHEN METABOLIC PANEL: CPT | Performed by: EMERGENCY MEDICINE

## 2021-03-08 RX ORDER — SODIUM CHLORIDE 0.9 % (FLUSH) 0.9 %
10 SYRINGE (ML) INJECTION AS NEEDED
Status: DISCONTINUED | OUTPATIENT
Start: 2021-03-08 | End: 2021-03-08 | Stop reason: HOSPADM

## 2021-03-08 RX ORDER — TOPIRAMATE 100 MG/1
100 TABLET, FILM COATED ORAL 2 TIMES DAILY
COMMUNITY
End: 2021-10-28

## 2021-03-08 RX ORDER — TRAZODONE HYDROCHLORIDE 100 MG/1
100 TABLET ORAL NIGHTLY
COMMUNITY
End: 2021-10-28

## 2021-03-08 RX ORDER — VENLAFAXINE 75 MG/1
150 TABLET ORAL NIGHTLY
COMMUNITY

## 2021-03-08 RX ORDER — BUTALBITAL, ACETAMINOPHEN, CAFFEINE AND CODEINE PHOSPHATE 50; 325; 40; 30 MG/1; MG/1; MG/1; MG/1
1 CAPSULE ORAL EVERY 4 HOURS PRN
COMMUNITY
End: 2021-10-28

## 2021-03-08 RX ORDER — ONDANSETRON 4 MG/1
4 TABLET, ORALLY DISINTEGRATING ORAL EVERY 8 HOURS PRN
COMMUNITY
End: 2023-02-22

## 2021-03-08 RX ADMIN — SODIUM CHLORIDE 1000 ML: 9 INJECTION, SOLUTION INTRAVENOUS at 18:28

## 2021-03-09 NOTE — ED PROVIDER NOTES
"Subjective   History of Present Illness  60-year-old male presents to emergency room complaining of intermittent spasms in his left arm and left leg.  He thinks is related to Topamax which he stopped taking about 5 days ago.  Says the leg is much worse.  Describes a spasm as a brief contraction of the muscles that lasts less than a couple seconds and then goes away.  He says it happens multiple times per hour.  He says he called his psychiatrist about the Topamax who said to stop taking it but he has not followed up with him.  Denies any other symptoms  Review of Systems   Respiratory: Negative for chest tightness and shortness of breath.    Cardiovascular: Negative for chest pain and leg swelling.   Neurological: Negative for seizures, syncope and weakness.   Psychiatric/Behavioral: Negative for confusion.       Past Medical History:   Diagnosis Date   • Arthritis    • Back pain    • Concussion    • GERD (gastroesophageal reflux disease)    • Hernia, inguinal    • Hypertension    • TBI (traumatic brain injury) (CMS/[facility]) 10/06/2020       No Known Allergies    Past Surgical History:   Procedure Laterality Date   • ABDOMINAL HERNIA REPAIR N/A 2001   • COLONOSCOPY N/A 2006   • COLONOSCOPY N/A 8/8/2019    Procedure: COLONOSCOPY INTO CECUM;  Surgeon: Genaro Blackwell MD;  Location: [facility] ENDOSCOPY;  Service: General   • ENDOSCOPY N/A 8/8/2019    Procedure: ESOPHAGOGASTRODUODENOSCOPY;  Surgeon: Genaro Blackwell MD;  Location: [facility] ENDOSCOPY;  Service: General   • EYE SURGERY      as a child for \"cross sided\"       Family History   Problem Relation Age of Onset   • Diverticulitis Mother    • Arthritis Mother    • Heart disease Father    • Colon polyps Father    • No Known Problems Sister    • Cancer Maternal Grandmother         cancer hx in their 90's   • Cancer Paternal Grandfather         cancer hx in their 90's       Social History     Socioeconomic History   • Marital status:      Spouse name: Not on " file   • Number of children: Not on file   • Years of education: Not on file   • Highest education level: Not on file   Tobacco Use   • Smoking status: Never Smoker   • Smokeless tobacco: Never Used   Substance and Sexual Activity   • Alcohol use: Yes     Alcohol/week: 1.0 - 2.0 standard drinks     Types: 1 - 2 Standard drinks or equivalent per week     Comment: OCCASIONAL   • Drug use: No   • Sexual activity: Defer           Objective    ED Triage Vitals [03/08/21 1716]   Temp Heart Rate Resp BP SpO2   98.9 °F (37.2 °C) 100 20 166/96 96 %      Temp src Heart Rate Source Patient Position BP Location FiO2 (%)   Oral Apical Sitting Left arm --       Physical Exam  INITIAL VITAL SIGNS: Reviewed by me.  Pulse ox normal  GENERAL: Alert and interactive. No acute distress.  HEAD: Head is normocephalic.  EYES: EOMI. PERRL. No scleral icterus. No conjunctival injection.  ENT: Moist mucous membranes.   NECK: Supple. Full range of motion.  RESPIRATORY: No tachypnea. Clear breath sounds bilaterally. No wheezing. No rales. No rhonchi.  CV: Regular rate and rhythm. No murmurs. No rubs or gallops.  BACK:  No obvious deformity.  EXTREMITIES: No deformity. No clubbing or cyanosis. No edema.   SKIN: Warm and dry. No diaphoresis. No obvious rashes.   NEUROLOGIC: Alert and oriented. Face is symmetric. Speech is normal. Moves all extremities equally. Motor and sensory distally intact.  DTRs intact  Results for orders placed or performed during the hospital encounter of 03/08/21   Comprehensive Metabolic Panel    Specimen: Blood   Result Value Ref Range    Glucose 147 (H) 65 - 99 mg/dL    BUN 9 6 - 20 mg/dL    Creatinine 1.08 0.76 - 1.27 mg/dL    Sodium 137 136 - 145 mmol/L    Potassium 3.5 3.5 - 5.2 mmol/L    Chloride 102 98 - 107 mmol/L    CO2 26.2 22.0 - 29.0 mmol/L    Calcium 8.8 8.6 - 10.5 mg/dL    Total Protein 6.3 6.0 - 8.5 g/dL    Albumin 3.40 (L) 3.50 - 5.20 g/dL    ALT (SGPT) 22 1 - 41 U/L    AST (SGOT) 17 1 - 40 U/L     Alkaline Phosphatase 88 39 - 117 U/L    Total Bilirubin <0.2 0.0 - 1.2 mg/dL    eGFR Non African Amer 70 >60 mL/min/1.73    Globulin 2.9 gm/dL    A/G Ratio 1.2 g/dL    BUN/Creatinine Ratio 8.3 7.0 - 25.0    Anion Gap 8.8 5.0 - 15.0 mmol/L   CBC Auto Differential    Specimen: Blood   Result Value Ref Range    WBC 6.93 3.40 - 10.80 10*3/mm3    RBC 4.86 4.14 - 5.80 10*6/mm3    Hemoglobin 13.7 13.0 - 17.7 g/dL    Hematocrit 41.0 37.5 - 51.0 %    MCV 84.4 79.0 - 97.0 fL    MCH 28.2 26.6 - 33.0 pg    MCHC 33.4 31.5 - 35.7 g/dL    RDW 13.0 12.3 - 15.4 %    RDW-SD 39.5 37.0 - 54.0 fl    MPV 9.4 6.0 - 12.0 fL    Platelets 212 140 - 450 10*3/mm3    Neutrophil % 54.6 42.7 - 76.0 %    Lymphocyte % 31.2 19.6 - 45.3 %    Monocyte % 7.8 5.0 - 12.0 %    Eosinophil % 5.3 0.3 - 6.2 %    Basophil % 0.4 0.0 - 1.5 %    Immature Grans % 0.7 (H) 0.0 - 0.5 %    Neutrophils, Absolute 3.78 1.70 - 7.00 10*3/mm3    Lymphocytes, Absolute 2.16 0.70 - 3.10 10*3/mm3    Monocytes, Absolute 0.54 0.10 - 0.90 10*3/mm3    Eosinophils, Absolute 0.37 0.00 - 0.40 10*3/mm3    Basophils, Absolute 0.03 0.00 - 0.20 10*3/mm3    Immature Grans, Absolute 0.05 0.00 - 0.05 10*3/mm3    nRBC 0.0 0.0 - 0.2 /100 WBC       Procedures           ED Course                                           MDM  Well-appearing 60-year-old male with a history of concussion and postconcussive syndrome who reports he was prescribed Topamax by his psychiatrist for headaches.  He says that he took it for about a month and thinks that it caused him to have some periodic muscle spasms of the left arm and left leg and so psychiatrist told him he could quit taking it this was about 5 days ago that he stopped.  He says he still has some intermittent spasms.  They last a couple seconds and go away.  He does not have any weakness he does not have any deficits.  On exam he appears in no distress and in my entire history and physical on reevaluation's I did not note any type of spasm of any  sort.  Labs are unremarkable and certainly do not have a metabolic abnormality that could be blamed for his reported spasms.  Encouraged him to follow-up with his primary care physician.  Final diagnoses:   Muscle spasm of left lower extremity            Luis Enrique Henriquez MD  03/08/21 213

## 2021-03-09 NOTE — DISCHARGE INSTRUCTIONS
Please follow-up with Dr. Bhagat as discussed.  Return to the emergency room if you develop acute onset weakness, severe pain or for any other concerns.

## 2021-05-03 ENCOUNTER — TELEPHONE (OUTPATIENT)
Dept: SLEEP MEDICINE | Facility: HOSPITAL | Age: 61
End: 2021-05-03

## 2021-05-03 NOTE — TELEPHONE ENCOUNTER
Prescription refill called for Lasix 20 mg amount 30  to [facility] pharmacy in [place].

## 2021-10-28 ENCOUNTER — HOSPITAL ENCOUNTER (OUTPATIENT)
Dept: INFUSION THERAPY | Facility: HOSPITAL | Age: 61
Discharge: HOME | End: 2021-10-28
Admitting: FAMILY MEDICINE

## 2021-10-28 VITALS
OXYGEN SATURATION: 95 % | TEMPERATURE: 97.6 F | SYSTOLIC BLOOD PRESSURE: 131 MMHG | DIASTOLIC BLOOD PRESSURE: 90 MMHG | HEART RATE: 84 BPM | RESPIRATION RATE: 16 BRPM

## 2021-10-28 DIAGNOSIS — U07.1 COVID-19: Primary | ICD-10-CM

## 2021-10-28 PROCEDURE — 25010000002 INJECTION, CASIRIVIMAB AND IMDEVIMAB, 1200 MG: Performed by: FAMILY MEDICINE

## 2021-10-28 PROCEDURE — M0243 CASIRIVI AND IMDEVI INFUSION: HCPCS | Performed by: FAMILY MEDICINE

## 2021-10-28 RX ORDER — QUETIAPINE FUMARATE 25 MG/1
25 TABLET, FILM COATED ORAL NIGHTLY
COMMUNITY

## 2021-10-28 RX ORDER — ROPINIROLE 1 MG/1
2 TABLET, FILM COATED ORAL NIGHTLY
COMMUNITY

## 2021-10-28 RX ORDER — DIPHENHYDRAMINE HYDROCHLORIDE 50 MG/ML
50 INJECTION INTRAMUSCULAR; INTRAVENOUS ONCE AS NEEDED
Status: DISCONTINUED | OUTPATIENT
Start: 2021-10-28 | End: 2021-10-30 | Stop reason: HOSPADM

## 2021-10-28 RX ORDER — EPINEPHRINE 1 MG/ML
0.3 INJECTION, SOLUTION, CONCENTRATE INTRAVENOUS AS NEEDED
Status: DISCONTINUED | OUTPATIENT
Start: 2021-10-28 | End: 2021-10-30 | Stop reason: HOSPADM

## 2021-10-28 RX ORDER — DIPHENHYDRAMINE HCL 50 MG
50 CAPSULE ORAL ONCE AS NEEDED
Status: DISCONTINUED | OUTPATIENT
Start: 2021-10-28 | End: 2021-10-30 | Stop reason: HOSPADM

## 2021-10-28 RX ADMIN — CASIRIVIMAB AND IMDEVIMAB 300 MG: 600; 600 INJECTION, SOLUTION, CONCENTRATE INTRAVENOUS at 12:15

## 2021-10-28 NOTE — NURSING NOTE
Patient arrived at 1135 received paperwork and discussed Regeneron  ( Casirivimab and Imdevimab) injections.  Monitored pt. for 1 hour post injections. Vitals within normal limits.  AVS discussed and copy given to patient.  Discharged at 1325 in stable condition.

## 2021-10-28 NOTE — PATIENT INSTRUCTIONS
"  [facility] at [phone] if you have any problems or concerns.    We know you have a Choice in healthcare and appreciate you using [facility].  Our purpose is to provide you \"Excellent Care\".  We hope that you will always choose us in the future and continue to recommend us to your family and frieCasirivimab; Imdevimab Injection  What is this medicine?  CASIRIVIMAB; IMDEVIMAB (ka SIR iv' i mab; im DEV i mab) is a monoclonal antibody used to treat COVID-19 in patients who are not hospitalized. It is also used to reduce the risk of getting COVID-19. It is for patients at risk of getting severe symptoms of COVID-19. It may decrease the risk of developing severe symptoms of COVID-19. It may also decrease the chance of going to the hospital. This medicine is not approved by the FDA. The FDA has authorized emergency use of this medicine during the COVID-19 pandemic.  This medicine may be used for other purposes; ask your health care provider or pharmacist if you have questions.  COMMON BRAND NAME(S): REGEN-COV  What should I tell my health care provider before I take this medicine?  They need to know if you have any of these conditions:  · any allergies  · any serious illness  · have received a COVID-19 vaccine  · an unusual or allergic reaction to casirivimab, imdevimab, other medicines, foods, dyes, or preservatives  · pregnant or trying to get pregnant  · breast-feeding  How should I use this medicine?  This medicine is injected into a vein or under the skin. It is given by a health care provider in a hospital or clinic setting.  Talk to your health care provider about the use of this medicine in children. While it may be given to children as young as 12 years for selected conditions, precautions do apply.  Overdosage: If you think you have taken too much of this medicine contact a poison control center or emergency room at once.  NOTE: This medicine is " only for you. Do not share this medicine with others.  What if I miss a dose?  Keep appointments for follow-up doses. It is important not to miss your dose. Call your health care provider if you are unable to keep an appointment.  What may interact with this medicine?  · COVID-19 vaccines  This list may not describe all possible interactions. Give your health care provider a list of all the medicines, herbs, non-prescription drugs, or dietary supplements you use. Also tell them if you smoke, drink alcohol, or use illegal drugs. Some items may interact with your medicine.  What should I watch for while using this medicine?  Your condition will be monitored carefully while you are receiving this medicine. Visit your health care provider for regular checks on your progress. Tell your health care provider if your symptoms do not start to get better or if they get worse.  After receiving this medicine, wait at least 90 days before getting the COVID-19 vaccine. If you have already received your first dose of the COVID-19 vaccine, wait at least 90 days after getting this medicine before getting your second dose of vaccine.  What side effects may I notice from receiving this medicine?  Side effects that you should report to your doctor or health care professional as soon as possible:  · allergic reactions (skin rash, itching or hives, swelling of the face, lips, or tongue)  · infusion-related reactions (chest pain or chest tightness, chills, fever, flushing, stomach pain, trouble breathing)  Side effects that usually do not require medical attention (report these to your doctor or health care professional if they continue or are bothersome):  · nausea  · pain, redness, or irritation at site where injected  This list may not describe all possible side effects. Call your doctor for medical advice about side effects. You may report side effects to FDA at 6-384-FDA-2792.  Where should I keep my medicine?  This medicine is given  in a hospital or clinic. It will not be stored at home.  NOTE: This sheet is a summary. It may not cover all possible information. If you have questions about this medicine, talk to your doctor, pharmacist, or health care provider.  © 2021 [employer]/Gold Standard (2021-08-02 14:09:12)

## 2021-11-02 ENCOUNTER — APPOINTMENT (OUTPATIENT)
Dept: SLEEP MEDICINE | Facility: HOSPITAL | Age: 61
End: 2021-11-02

## 2021-11-18 ENCOUNTER — OFFICE VISIT (OUTPATIENT)
Dept: SLEEP MEDICINE | Facility: HOSPITAL | Age: 61
End: 2021-11-18

## 2021-11-18 VITALS
HEIGHT: 72 IN | DIASTOLIC BLOOD PRESSURE: 80 MMHG | BODY MASS INDEX: 42.66 KG/M2 | SYSTOLIC BLOOD PRESSURE: 126 MMHG | HEART RATE: 97 BPM | WEIGHT: 315 LBS

## 2021-11-18 DIAGNOSIS — I27.81 COR PULMONALE, CHRONIC (HCC): ICD-10-CM

## 2021-11-18 DIAGNOSIS — G47.33 OBSTRUCTIVE SLEEP APNEA, ADULT: Primary | ICD-10-CM

## 2021-11-18 DIAGNOSIS — I10 ESSENTIAL HYPERTENSION, MALIGNANT: ICD-10-CM

## 2021-11-18 DIAGNOSIS — E66.01 MORBID (SEVERE) OBESITY DUE TO EXCESS CALORIES (HCC): ICD-10-CM

## 2021-11-18 PROCEDURE — G0463 HOSPITAL OUTPT CLINIC VISIT: HCPCS

## 2021-11-18 NOTE — PROGRESS NOTES
"SLEEP/PULMONARY  CLINIC NOTE      PATIENT IDENTIFICATION:  Name: Michael Lopez  Age: 61 y.o.  Sex: male  :  1960  MRN: [id number]    DATE OF CONSULTATION:  2021                     CHIEF COMPLAINT: Obstructive sleep apnea    History of Present Illness:   Michael Lopez is a 61 y.o. male Pt on CPAP feeling better more energy especially the night he use it more than 4 hours, no sleepiness no fatigue no tiredness, no mask irritation no dryness, compliance report reviewed with pt AHI<12 with good usage.   Patient states that she has a traumatic brain injury a year ago and he has some forgetfulness now    Review of Systems:   Constitutional:  As above   Eyes: negative   ENT/oropharynx: negative   Cardiovascular: negative   Respiratory: negative   Gastrointestinal: negative   Genitourinary: negative   Neurological: negative   Musculoskeletal: negative   Integument/breast: negative   Endocrine: negative   Allergic/Immunologic: negative     Past Medical History:  Past Medical History:   Diagnosis Date   • Arthritis    • Back pain    • Concussion    • GERD (gastroesophageal reflux disease)    • Hernia, inguinal    • Hypertension    • TBI (traumatic brain injury) ([facility]) 10/06/2020       Past Surgical History:  Past Surgical History:   Procedure Laterality Date   • ABDOMINAL HERNIA REPAIR N/A    • COLONOSCOPY N/A    • COLONOSCOPY N/A 2019    Procedure: COLONOSCOPY INTO CECUM;  Surgeon: Genaro Blackwell MD;  Location: [facility] ENDOSCOPY;  Service: General   • ENDOSCOPY N/A 2019    Procedure: ESOPHAGOGASTRODUODENOSCOPY;  Surgeon: Genaro Blackwell MD;  Location: [facility] ENDOSCOPY;  Service: General   • EYE SURGERY      as a child for \"cross sided\"        Family History:  Family History   Problem Relation Age of Onset   • Diverticulitis Mother    • Arthritis Mother    • Heart disease Father    • Colon polyps Father    • No Known Problems Sister    • Cancer Maternal Grandmother         cancer hx in " their 90's   • Cancer Paternal Grandfather         cancer hx in their 90's        Social History:   Social History     Tobacco Use   • Smoking status: Never Smoker   • Smokeless tobacco: Never Used   Substance Use Topics   • Alcohol use: Yes     Alcohol/week: 1.0 - 2.0 standard drink     Types: 1 - 2 Standard drinks or equivalent per week     Comment: OCCASIONAL        Allergies:  No Known Allergies    Home Meds:  (Not in a hospital admission)      Objective:    Vitals Ranges:   Heart Rate:  [97] 97  BP: (126)/(80) 126/80  Body mass index is 47.33 kg/m².     Exam:  General Appearance:  WDWN    HEENT:   without obvious abnormality,  Conjunctiva/corneas clear,  Normal external ear canals, no drainage    Clear orsalmucosa,  Mallampati score 3    Neck:  Supple, symmetrical, trachea midline. No JVD.  Lungs:   Bilateral basal rhonchi bilaterally, respirations unlabored symmetrical wall movement.    Chest wall:  No tenderness or deformity.    Heart:  Regular rate and rhythm, S1 and S2 normal.  Extremities: Trace edema no clubbing or Cyanosis        Data Review:  All labs (24hrs): No results found for this or any previous visit (from the past 24 hour(s)).     Imaging:  MRI Brain Without Contrast  Narrative: MRI OF THE BRAIN WITHOUT CONTRAST     CLINICAL HISTORY: Brain injury and headache.     MRI of the brain was obtained with sagittal T1, axial T1, axial FLAIR,  axial T2, axial diffusion, axial gradient echo, and axial susceptibility  weighted images.     Comparison is made to previous MRI of the brain dated 11/12/2020.     FINDINGS:     The ventricles, sulci, and cisterns are age appropriate. There are no  abnormal areas of restricted diffusion. No abnormal areas of  susceptibility artifact are noted. There is no convincing evidence for  stigmata of acute or chronic traumatic brain injury. Minor changes of  chronic small vessel ischemic phenomena are noted which are stable when  compared to the prior study of 11/12/2020.  The midline intracranial  anatomy is unremarkable. The major intracranial flow related signal  voids are unremarkable.     Incidental note is made of a trace right mastoid effusion which is  stable in appearance when compared to the prior exam.     Impression:    Stable findings when compared to prior study of 11/12/2020. There is no  convincing evidence for stigmata of acute or chronic traumatic brain  injury. Relatively minor and stable changes of chronic small vessel  ischemic phenomena are incidentally noted.     This report was finalized on 1/25/2021 1:46 PM by Dr. Raman Hines M.D.          ASSESSMENT:  Diagnoses and all orders for this visit:    Obstructive sleep apnea, adult    Essential hypertension, malignant    Cor pulmonale, chronic (HCC)    Morbid (severe) obesity due to excess calories (HCC)        PLAN:  Get an overnight oximetry to evaluate his CPAP machine effectiveness    This patient with obstructive sleep apnea, compliance is improved. Encourage to use it more frequent, I re-emphasized on pt the long and short term benefit of treating NATALIYA.     Treating NATALIYA will improve BP control and cor pulmonale symptoms       Follow-up 2 months    Corey Parish MD. D, ABSM.  11/18/2021  11:33 EST

## 2021-12-06 ENCOUNTER — TRANSCRIBE ORDERS (OUTPATIENT)
Dept: ADMINISTRATIVE | Facility: HOSPITAL | Age: 61
End: 2021-12-06

## 2021-12-06 DIAGNOSIS — R13.12 OROPHARYNGEAL DYSPHAGIA: Primary | ICD-10-CM

## 2021-12-27 ENCOUNTER — HOSPITAL ENCOUNTER (OUTPATIENT)
Dept: GENERAL RADIOLOGY | Facility: HOSPITAL | Age: 61
Discharge: HOME | End: 2021-12-27

## 2021-12-27 DIAGNOSIS — R13.12 OROPHARYNGEAL DYSPHAGIA: ICD-10-CM

## 2021-12-27 PROCEDURE — 74230 X-RAY XM SWLNG FUNCJ C+: CPT

## 2021-12-27 PROCEDURE — 92611 MOTION FLUOROSCOPY/SWALLOW: CPT

## 2021-12-27 PROCEDURE — 74220 X-RAY XM ESOPHAGUS 1CNTRST: CPT

## 2021-12-27 PROCEDURE — 74240 X-RAY XM UPR GI TRC 1CNTRST: CPT

## 2021-12-27 RX ADMIN — ANTACID/ANTIFLATULENT 1 PACKET: 380; 550; 10; 10 GRANULE, EFFERVESCENT ORAL at 12:18

## 2021-12-27 RX ADMIN — BARIUM SULFATE 183 ML: 960 POWDER, FOR SUSPENSION ORAL at 12:18

## 2021-12-27 RX ADMIN — BARIUM SULFATE 700 MG: 700 TABLET ORAL at 12:18

## 2021-12-27 RX ADMIN — BARIUM SULFATE 340 ML: 980 POWDER, FOR SUSPENSION ORAL at 12:18

## 2021-12-27 NOTE — MBS/VFSS/FEES
"Outpatient Speech Language Pathology   Adult Swallow Initial Evaluation   Modified Barium Swallow Study   Jaymie Kamara     Patient Name: Michael Lopez  : 1960  MRN: [id number]  Today's Date: 2021         Visit Date: 2021   Patient Active Problem List   Diagnosis   • Hypertension   • Neuritis or radiculitis due to rupture of lumbar intervertebral disc   • Snoring   • Essential hypertension, malignant   • Obstructive sleep apnea, adult   • Cor pulmonale, chronic (HCC)   • Oropharyngeal dysphagia   • Cervical pain   • Acute shoulder pain   • Morbid (severe) obesity due to excess calories ([facility])        Past Medical History:   Diagnosis Date   • Arthritis    • Back pain    • Concussion    • GERD (gastroesophageal reflux disease)    • Hernia, inguinal    • Hypertension    • TBI (traumatic brain injury) ([facility]) 10/06/2020        Past Surgical History:   Procedure Laterality Date   • ABDOMINAL HERNIA REPAIR N/A    • COLONOSCOPY N/A    • COLONOSCOPY N/A 2019    Procedure: COLONOSCOPY INTO CECUM;  Surgeon: Genaro Blackwell MD;  Location: [facility] ENDOSCOPY;  Service: General   • ENDOSCOPY N/A 2019    Procedure: ESOPHAGOGASTRODUODENOSCOPY;  Surgeon: Genaro Blackwell MD;  Location: [facility] ENDOSCOPY;  Service: General   • EYE SURGERY      as a child for \"cross sided\"         Visit Dx:     ICD-10-CM ICD-9-CM   1. Oropharyngeal dysphagia  R13.12 787.22            OP SLP Assessment/Plan - 21 1200        SLP Assessment    Functional Problems Swallowing  -AD    Clinical Impression: Swallowing WNL  -AD    SLP Diagnosis Functional oropharyngeal swallow  -AD    Patient would benefit from skilled therapy intervention No  -AD       SLP Plan    Frequency evaluation only  -AD          User Key  (r) = Recorded By, (t) = Taken By, (c) = Cosigned By    Initials Name Provider Type    Elke Mattson MS CCC-SLP Speech and Language Pathologist                 SLP Adult Swallow Evaluation     Row Name " 12/27/21 1200       Rehab Evaluation    Document Type evaluation  -AD    Subjective Information no complaints  -AD    Patient Observations alert; cooperative; agree to therapy  -AD    Patient/Family/Caregiver Comments/Observations Pt seen for MBS portion both seated in left lateral position and upright in AP position for barium tablet. Alert and cooperative throughout the exam.  -AD    Patient Effort good  -AD    Symptoms Noted During/After Treatment none  -AD       General Information    Patient Profile Reviewed yes  -AD    Pertinent History Of Current Problem Pt is a 62 y/o male referred for a modified barium swallow study due to c/o food and pills sticking in his throat. Reports onset about 6-8 months ago with worsening of symptoms recently. Reports he got choked on steak and then vomited up blood. Hx is significant for post concussive syndrome w/onset in late 2020. Some concerns for recent seizures as well that are under current evaluation per neurology. Esophagram/UGI also ordered.  -AD    Current Method of Nutrition regular textures; thin liquids  -AD    Precautions/Limitations, Vision WFL with corrective lenses  -AD    Precautions/Limitations, Hearing WFL  -AD    Prior Level of Function-Communication WFL; other (see comments)  hx of mild cognitive impairment r/t concussion syndrome  -AD    Prior Level of Function-Swallowing no diet consistency restrictions; safe, efficient swallowing in all situations; regular textures; thin liquids; esophageal concerns  -AD    Plans/Goals Discussed with patient; agreed upon  -AD    Barriers to Rehab none identified  -AD    Patient's Goals for Discharge --  eat or drink w/o food getting stuck  -AD       Pain    Additional Documentation --  no current pain reported  -AD       Oral Motor Structure and Function    Oral Lesions or Structural Abnormalities and/or variants none  -AD    Dentition Assessment natural, present and adequate  -AD    Secretion Management WNL/WFL  -AD     Mucosal Quality moist, healthy  -AD    Volitional Swallow WFL  -AD    Volitional Cough WFL  -AD       Oral Musculature and Cranial Nerve Assessment    Oral Motor General Assessment WFL  -AD       General Eating/Swallowing Observations    Respiratory Support Currently in Use room air  -AD    Eating/Swallowing Skills fed by SLP  -AD    Positioning During Eating upright 90 degree; upright in chair  left lateral and AP view standing  -AD       Respiratory    Respiratory Status WFL; room air; during swallowing/eating  -AD       MBS/VFSS    Utensils Used spoon; cup; straw  -AD    Consistencies Trialed regular textures; pureed; thin liquids; nectar/syrup-thick liquids; honey-thick liquids; barium pill  -AD       MBS/VFSS Interpretation    Oral Prep Phase WFL  -AD    Oral Transit Phase WFL  -AD    Oral Residue WFL  -AD    VFSS Summary Pt presents with an essentially functional oropharyngeal swallow.Piecemeal swallow noted on one trial of nectar thick by cup. Tongue pumping and mild vallecular residue on one trial of puree, but able to spontaneously clear. No other episodes noted. No penetration or aspiration was viewed during the study. Barium tablet passed through the oral cavity and pharynx without observed abnormality.  -AD    Oral Phase, Comment Within functional limits. One episode of piecemeal swallow of nectar thick by cup. Mild tongue pumping on 1/2 trial of puree. NO other deficits noted.  -AD       Initiation of Pharyngeal Swallow    Initiation of Pharyngeal Swallow bolus in valleculae  -AD    Pharyngeal Phase functional pharyngeal phase of swallowing  -AD    Rosenbek's Scale thin:; nectar:; honey:; pudding/puree:; regular textures:; 1--->level 1  barium tablet, level 1  -AD    Pharyngeal Phase, Comment Pt presents with a functional pharyngeal phase of swallowing. Decreased BOT retraction on one trial of Nectar thick liquids out of 3 trials. This resulted in mild pooling in the valleculae. Mild vallecullar residue  on 1/2 trials of puree w/spontaneous clearing. No penetration or aspiration was viewed.  -AD       Esophageal Phase    Esophageal Phase see radiology report for further details; other (see comments)  -AD    Esophageal Phase, Comment Barium tablet was observed to promptly pass through the oral cavity and pharynx without observed abnormality. See UGI report regarding esophageal motility and function.  -AD       SLP Evaluation Clinical Impression    SLP Swallowing Diagnosis swallow WFL; functional oral phase; functional pharyngeal phase  -AD    Functional Impact no impact on function  -AD    Swallow Criteria for Skilled Therapeutic Interventions Met no problems identified which require skilled intervention  -AD       Recommendations    Therapy Frequency (Swallow) evaluation only  -AD    SLP Diet Recommendation thin liquids  -AD    Recommended Diagnostics No further SLP services recommended  -AD    Recommended Precautions and Strategies upright posture during/after eating; reflux precautions  -AD    Oral Care Recommendations Oral Care BID/PRN  -AD    SLP Rec. for Method of Medication Administration meds whole; with thin liquids; as tolerated  -AD    Monitor for Signs of Aspiration no  -AD    Anticipated Discharge Disposition (SLP) home  -AD    Patient/Family Concerns, Anticipated Discharge Disposition (SLP) No concerns reported per patient at this time.  -AD          User Key  (r) = Recorded By, (t) = Taken By, (c) = Cosigned By    Initials Name Provider Type    AD Elke Schwartz MS CCC-SLP Speech and Language Pathologist                   OP SLP Education     Row Name 12/27/21 1200       Education    Barriers to Learning No barriers identified  -AD    Education Provided Described results of evaluation; Patient expressed understanding of evaluation  -AD    Assessed Learning needs; Learning motivation; Learning preferences; Learning readiness  -AD    Learning Motivation Strong  -AD    Learning Method Explanation   -AD    Teaching Response Verbalized understanding  -AD    Education Comments Pt verbalizes understanding of results and recommendations from MBS. Images reviewed.  -AD          User Key  (r) = Recorded By, (t) = Taken By, (c) = Cosigned By    Initials Name Effective Dates    AD Elke Schwartz, MS CCC-SLP 06/16/21 -                          Time Calculation:   SLP Start Time: 1100  SLP Stop Time: 1200  SLP Time Calculation (min): 60 min  SLP Non-Billable Time (min): 0 min  Total Timed Code Minutes- SLP: 0 minute(s)  Untimed Charges  SLP Eval/Re-eval : ST [employer] Fluoro Eval Swallow - 75735  77601-[place] Motion Fluoro Eval Swallow Minutes: 60  Total Minutes  Untimed Charges Total Minutes: 60   Total Minutes: 60    Therapy Charges for Today     Code Description Service Date Service Provider Modifiers Qty    [id number]  ST MOTION FLUORO EVAL SWALLOW 4 12/27/2021 Elke Schwartz, MS CCC-SLP GN 1                   Elke Schwartz MS CCC-SLP  12/27/2021

## 2022-02-22 ENCOUNTER — OFFICE VISIT (OUTPATIENT)
Dept: SLEEP MEDICINE | Facility: HOSPITAL | Age: 62
End: 2022-02-22

## 2022-02-22 VITALS
WEIGHT: 315 LBS | HEIGHT: 72 IN | BODY MASS INDEX: 42.66 KG/M2 | SYSTOLIC BLOOD PRESSURE: 139 MMHG | DIASTOLIC BLOOD PRESSURE: 79 MMHG | HEART RATE: 94 BPM

## 2022-02-22 DIAGNOSIS — E66.01 MORBID (SEVERE) OBESITY DUE TO EXCESS CALORIES: ICD-10-CM

## 2022-02-22 DIAGNOSIS — I27.81 COR PULMONALE, CHRONIC: ICD-10-CM

## 2022-02-22 DIAGNOSIS — I10 PRIMARY HYPERTENSION: ICD-10-CM

## 2022-02-22 DIAGNOSIS — G47.33 OBSTRUCTIVE SLEEP APNEA, ADULT: Primary | ICD-10-CM

## 2022-02-22 PROCEDURE — G0463 HOSPITAL OUTPT CLINIC VISIT: HCPCS

## 2022-02-22 NOTE — PROGRESS NOTES
"SLEEP/PULMONARY  CLINIC NOTE      PATIENT IDENTIFICATION:  Name: Michael Lopez  Age: 61 y.o.  Sex: male  :  1960  MRN: [id number]    DATE OF CONSULTATION:  2022                     CHIEF COMPLAINT: NATALIYA    History of Present Illness:   Michael Lopez is a 61 y.o. male Pt on PAP was feeling better more energy but recently more  sleepiness  fatigue and  tiredness, and snoring with the mask usage, no mask irritation no dryness, compliance report reviewed with pt. and AHI still more than 13      Review of Systems:   Constitutional: As above    Eyes: negative   ENT/oropharynx: negative   Cardiovascular: negative   Respiratory: negative   Gastrointestinal: negative   Genitourinary: negative   Neurological: negative   Musculoskeletal: negative   Integument/breast: negative   Endocrine: negative   Allergic/Immunologic: negative     Past Medical History:  Past Medical History:   Diagnosis Date   • Arthritis    • Back pain    • Concussion    • GERD (gastroesophageal reflux disease)    • Hernia, inguinal    • Hypertension    • TBI (traumatic brain injury) ([facility]) 10/06/2020       Past Surgical History:  Past Surgical History:   Procedure Laterality Date   • ABDOMINAL HERNIA REPAIR N/A    • COLONOSCOPY N/A    • COLONOSCOPY N/A 2019    Procedure: COLONOSCOPY INTO CECUM;  Surgeon: Genaro Blackwell MD;  Location: [facility] ENDOSCOPY;  Service: General   • ENDOSCOPY N/A 2019    Procedure: ESOPHAGOGASTRODUODENOSCOPY;  Surgeon: Genaro Blackwell MD;  Location: [facility] ENDOSCOPY;  Service: General   • EYE SURGERY      as a child for \"cross sided\"        Family History:  Family History   Problem Relation Age of Onset   • Diverticulitis Mother    • Arthritis Mother    • Heart disease Father    • Colon polyps Father    • No Known Problems Sister    • Cancer Maternal Grandmother         cancer hx in their 90's   • Cancer Paternal Grandfather         cancer hx in their 90's        Social History:   Social History "     Tobacco Use   • Smoking status: Never Smoker   • Smokeless tobacco: Never Used   Substance Use Topics   • Alcohol use: Yes     Alcohol/week: 1.0 - 2.0 standard drink     Types: 1 - 2 Standard drinks or equivalent per week     Comment: OCCASIONAL        Allergies:  No Known Allergies    Home Meds:  (Not in a hospital admission)      Objective:    Vitals Ranges:   Heart Rate:  [94] 94  BP: (139)/(79) 139/79  Body mass index is 48.01 kg/m².     Exam:  General Appearance:  WDWN    HEENT:   without obvious abnormality,  Conjunctiva/corneas clear,  Normal external ear canals, no drainage    Clear orsalmucosa,  Mallampati score 3    Neck:  Supple, symmetrical, trachea midline. No JVD.  Lungs:   Bilateral basal rhonchi bilaterally, respirations unlabored symmetrical wall movement.    Chest wall:  No tenderness or deformity.    Heart:  Regular rate and rhythm, S1 and S2 normal.  Extremities: Trace edema no clubbing or Cyanosis        Data Review:  All labs (24hrs): No results found for this or any previous visit (from the past 24 hour(s)).     Imaging:  FL Upper GI Single Contrast  Narrative: UPPER GI EXAMINATION, 12/27/2021     INDICATION:  61-year-old male with chronic difficulty swallowing. Worsening symptoms  the past 8 months. The patient reports more difficulty with solid foods  and pills. No history of prior surgery.     TECHNIQUE:  Upper GI examination was performed using double contrast technique.  *  Fluoroscopy time, 2.1 minutes  *  71 fluoroscopic images were recorded.     FINDINGS:     The esophagus demonstrates an unremarkable primary stripping wave. Only  minimal secondary and tertiary contractions in the mid to distal third  esophagus. No aspiration identified. No esophageal mass or esophageal  stricture. There is a small hiatal hernia present. Mild spontaneous  gastroesophageal reflux in the distal third esophagus. Incomplete  coating of the stomach with barium, limiting study sensitivity, but no  focal  filling defect or intraluminal mass and no evidence of gastric  outlet obstruction. Incomplete distention of the duodenal bulb. Duodenal  bulb and duodenal sweep unremarkable as visualized. These were the best  images possible given patient body habitus.     Impression:    1. Small hiatal hernia and mild spontaneous gastroesophageal reflux.  Otherwise essentially negative upper GI study.     This report was finalized on 12/27/2021 2:13 PM by Dr. Denis Morales MD.     FL Video Swallow With Speech Single Contrast  Narrative: VIDEO SWALLOWING STUDY, 12/27/2021     HISTORY:  Chronic difficulty swallowing. Worsening symptoms over the past 8  months. The patient reports the most difficulty with pills and solid  foods.     TECHNIQUE:  Video swallowing study was conducted by the speech pathologist in my  presence and recorded on digital video disc.     Fluoroscopy time 1.6 minutes. A single spot image was recorded for  documentation purposes.     FINDINGS:  No penetration or aspiration identified. A barium pill passed easily  through the esophagus and across the GE junction with only transient  delay at the GE junction. Please see the full report of the speech  pathologist for further details and dietary recommendations.     Impression: 1. No penetration or aspiration.     This report was finalized on 12/27/2021 1:54 PM by Dr. Denis Morales MD.          ASSESSMENT:  Diagnoses and all orders for this visit:    Obstructive sleep apnea, adult    Morbid (severe) obesity due to excess calories (HCC)    Primary hypertension    Cor pulmonale, chronic (HCC)        PLAN:   This is patient with symptoms of obstructive sleep apnea, a titration study with AHI still high, Avoid supine avoid sedative meds in pm, weight loss, Avoid driving. Long discussion with patient about the physiology of NATALIYA, and long term and short term   benefit of treating NATALIYA        Treating NATALIYA will improve BP control  Increase diuretics for 3 days    Follow-up  after the titration    Corey Parish MD. D, ABSM.  2/22/2022  14:18 EST

## 2022-03-02 ENCOUNTER — TRANSCRIBE ORDERS (OUTPATIENT)
Dept: ADMINISTRATIVE | Facility: HOSPITAL | Age: 62
End: 2022-03-02

## 2022-03-02 DIAGNOSIS — Z01.818 OTHER SPECIFIED PRE-OPERATIVE EXAMINATION: Primary | ICD-10-CM

## 2022-03-08 ENCOUNTER — APPOINTMENT (OUTPATIENT)
Dept: LAB | Facility: HOSPITAL | Age: 62
End: 2022-03-08

## 2022-03-10 ENCOUNTER — HOSPITAL ENCOUNTER (OUTPATIENT)
Dept: SLEEP MEDICINE | Facility: HOSPITAL | Age: 62
End: 2022-03-10

## 2022-03-14 ENCOUNTER — APPOINTMENT (OUTPATIENT)
Dept: CT IMAGING | Facility: HOSPITAL | Age: 62
End: 2022-03-14

## 2022-03-14 ENCOUNTER — HOSPITAL ENCOUNTER (EMERGENCY)
Facility: HOSPITAL | Age: 62
Discharge: HOME | End: 2022-03-14
Attending: EMERGENCY MEDICINE | Admitting: EMERGENCY MEDICINE

## 2022-03-14 ENCOUNTER — APPOINTMENT (OUTPATIENT)
Dept: GENERAL RADIOLOGY | Facility: HOSPITAL | Age: 62
End: 2022-03-14

## 2022-03-14 VITALS
HEIGHT: 72 IN | RESPIRATION RATE: 20 BRPM | BODY MASS INDEX: 42.66 KG/M2 | WEIGHT: 315 LBS | SYSTOLIC BLOOD PRESSURE: 133 MMHG | HEART RATE: 94 BPM | OXYGEN SATURATION: 96 % | TEMPERATURE: 98.2 F | DIASTOLIC BLOOD PRESSURE: 85 MMHG

## 2022-03-14 DIAGNOSIS — R51.9 NONINTRACTABLE HEADACHE, UNSPECIFIED CHRONICITY PATTERN, UNSPECIFIED HEADACHE TYPE: ICD-10-CM
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
## 2022-03-14 DIAGNOSIS — M54.2 NECK PAIN: ICD-10-CM

## 2022-03-14 PROCEDURE — 72125 CT NECK SPINE W/O DYE: CPT

## 2022-03-14 PROCEDURE — 93010 ELECTROCARDIOGRAM REPORT: CPT | Performed by: INTERNAL MEDICINE

## 2022-03-14 PROCEDURE — 93005 ELECTROCARDIOGRAM TRACING: CPT | Performed by: EMERGENCY MEDICINE

## 2022-03-14 PROCEDURE — 99283 EMERGENCY DEPT VISIT LOW MDM: CPT

## 2022-03-14 PROCEDURE — 71045 X-RAY EXAM CHEST 1 VIEW: CPT

## 2022-03-14 PROCEDURE — 70450 CT HEAD/BRAIN W/O DYE: CPT

## 2022-03-14 PROCEDURE — 99283 EMERGENCY DEPT VISIT LOW MDM: CPT | Performed by: PHYSICIAN ASSISTANT

## 2022-03-14 RX ORDER — ACETAMINOPHEN 500 MG
1000 TABLET ORAL ONCE
Status: COMPLETED | OUTPATIENT
Start: 2022-03-14 | End: 2022-03-14

## 2022-03-14 RX ADMIN — ACETAMINOPHEN 1000 MG: 500 TABLET, FILM COATED ORAL at 14:13
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
## 2022-03-14 NOTE — DISCHARGE INSTRUCTIONS
Follow-up with primary care for your ongoing headache and neck pain.  Take ibuprofen and Tylenol to help with pain at home.

## 2022-03-14 NOTE — ED NOTES
Pt refusing discharge vitals because his ride is here. Pt standing in doorway waiting for discharge paper.

## 2022-03-16 LAB — QT INTERVAL: 379 MS

## 2022-03-18 ENCOUNTER — TRANSCRIBE ORDERS (OUTPATIENT)
Dept: ADMINISTRATIVE | Facility: HOSPITAL | Age: 62
End: 2022-03-18

## 2022-03-18 DIAGNOSIS — Z01.818 OTHER SPECIFIED PRE-OPERATIVE EXAMINATION: Primary | ICD-10-CM

## 2022-03-24 DIAGNOSIS — G47.33 OSA (OBSTRUCTIVE SLEEP APNEA): Primary | ICD-10-CM

## 2022-03-26 ENCOUNTER — LAB (OUTPATIENT)
Dept: LAB | Facility: HOSPITAL | Age: 62
End: 2022-03-26

## 2022-03-26 DIAGNOSIS — G47.33 OSA (OBSTRUCTIVE SLEEP APNEA): ICD-10-CM

## 2022-03-26 DIAGNOSIS — Z01.818 OTHER SPECIFIED PRE-OPERATIVE EXAMINATION: ICD-10-CM

## 2022-03-26 LAB — SARS-COV-2 RNA PNL SPEC NAA+PROBE: NOT DETECTED

## 2022-03-26 PROCEDURE — C9803 HOPD COVID-19 SPEC COLLECT: HCPCS

## 2022-03-26 PROCEDURE — 87635 SARS-COV-2 COVID-19 AMP PRB: CPT | Performed by: INTERNAL MEDICINE

## 2022-03-29 ENCOUNTER — HOSPITAL ENCOUNTER (OUTPATIENT)
Dept: SLEEP MEDICINE | Facility: HOSPITAL | Age: 62
Discharge: HOME | End: 2022-03-29
Admitting: INTERNAL MEDICINE

## 2022-03-29 DIAGNOSIS — G47.33 OBSTRUCTIVE SLEEP APNEA, ADULT: ICD-10-CM

## 2022-03-29 PROCEDURE — 95811 POLYSOM 6/>YRS CPAP 4/> PARM: CPT

## 2022-04-08 ENCOUNTER — TRANSCRIBE ORDERS (OUTPATIENT)
Dept: ADMINISTRATIVE | Facility: HOSPITAL | Age: 62
End: 2022-04-08

## 2022-04-08 DIAGNOSIS — R07.89 CHEST WALL PAIN: Primary | ICD-10-CM

## 2022-04-11 ENCOUNTER — TELEPHONE (OUTPATIENT)
Dept: SLEEP MEDICINE | Facility: HOSPITAL | Age: 62
End: 2022-04-11

## 2022-04-11 NOTE — TELEPHONE ENCOUNTER
Called and spoke with patients wife to advise order written for new unit. Advised to please send unit to [facility]. Faxed today. Advised to call and schedule a compliance visit once new unit has been received. She understood.

## 2022-04-20 ENCOUNTER — TELEPHONE (OUTPATIENT)
Dept: SLEEP MEDICINE | Facility: HOSPITAL | Age: 62
End: 2022-04-20

## 2022-04-20 NOTE — TELEPHONE ENCOUNTER
Received message from [employer], advised patient is not eligible for replacement until february 2023.

## 2022-10-27 ENCOUNTER — TRANSCRIBE ORDERS (OUTPATIENT)
Dept: ADMINISTRATIVE | Facility: HOSPITAL | Age: 62
End: 2022-10-27

## 2022-10-27 ENCOUNTER — HOSPITAL ENCOUNTER (OUTPATIENT)
Dept: ULTRASOUND IMAGING | Facility: HOSPITAL | Age: 62
Discharge: HOME | End: 2022-10-27
Admitting: FAMILY MEDICINE

## 2022-10-27 DIAGNOSIS — R60.0 LEG EDEMA: Primary | ICD-10-CM

## 2022-10-27 DIAGNOSIS — R60.0 LEG EDEMA: ICD-10-CM

## 2022-10-27 PROCEDURE — 93971 EXTREMITY STUDY: CPT

## 2023-02-22 ENCOUNTER — HOSPITAL ENCOUNTER (OUTPATIENT)
Dept: GENERAL RADIOLOGY | Facility: HOSPITAL | Age: 63
Discharge: HOME | End: 2023-02-22
Payer: COMMERCIAL

## 2023-02-22 ENCOUNTER — PRE-ADMISSION TESTING (OUTPATIENT)
Dept: PREADMISSION TESTING | Facility: HOSPITAL | Age: 63
End: 2023-02-22
Payer: COMMERCIAL

## 2023-02-22 VITALS
BODY MASS INDEX: 42.66 KG/M2 | DIASTOLIC BLOOD PRESSURE: 89 MMHG | HEART RATE: 97 BPM | WEIGHT: 315 LBS | TEMPERATURE: 98 F | SYSTOLIC BLOOD PRESSURE: 142 MMHG | RESPIRATION RATE: 18 BRPM | HEIGHT: 72 IN | OXYGEN SATURATION: 96 %

## 2023-02-22 LAB
ALBUMIN SERPL-MCNC: 3.9 G/DL (ref 3.5–5.2)
ALBUMIN/GLOB SERPL: 1.4 G/DL
ALP SERPL-CCNC: 86 U/L (ref 39–117)
ALT SERPL W P-5'-P-CCNC: 18 U/L (ref 1–41)
ANION GAP SERPL CALCULATED.3IONS-SCNC: 5.1 MMOL/L (ref 5–15)
AST SERPL-CCNC: 14 U/L (ref 1–40)
BILIRUB SERPL-MCNC: 0.3 MG/DL (ref 0–1.2)
BILIRUB UR QL STRIP: NEGATIVE
BUN SERPL-MCNC: 12 MG/DL (ref 8–23)
BUN/CREAT SERPL: 9.9 (ref 7–25)
CALCIUM SPEC-SCNC: 9.1 MG/DL (ref 8.6–10.5)
CHLORIDE SERPL-SCNC: 109 MMOL/L (ref 98–107)
CLARITY UR: CLEAR
CO2 SERPL-SCNC: 25.9 MMOL/L (ref 22–29)
COLOR UR: YELLOW
CREAT SERPL-MCNC: 1.21 MG/DL (ref 0.76–1.27)
DEPRECATED RDW RBC AUTO: 41.3 FL (ref 37–54)
EGFRCR SERPLBLD CKD-EPI 2021: 68.5 ML/MIN/1.73
ERYTHROCYTE [DISTWIDTH] IN BLOOD BY AUTOMATED COUNT: 13.6 % (ref 12.3–15.4)
GLOBULIN UR ELPH-MCNC: 2.7 GM/DL
GLUCOSE SERPL-MCNC: 80 MG/DL (ref 65–99)
GLUCOSE UR STRIP-MCNC: NEGATIVE MG/DL
HCT VFR BLD AUTO: 40.5 % (ref 37.5–51)
HGB BLD-MCNC: 13.8 G/DL (ref 13–17.7)
HGB UR QL STRIP.AUTO: NEGATIVE
KETONES UR QL STRIP: NEGATIVE
LEUKOCYTE ESTERASE UR QL STRIP.AUTO: NEGATIVE
MCH RBC QN AUTO: 28.5 PG (ref 26.6–33)
MCHC RBC AUTO-ENTMCNC: 34.1 G/DL (ref 31.5–35.7)
MCV RBC AUTO: 83.5 FL (ref 79–97)
NITRITE UR QL STRIP: NEGATIVE
PH UR STRIP.AUTO: 5.5 [PH] (ref 5–8)
PLATELET # BLD AUTO: 207 10*3/MM3 (ref 140–450)
PMV BLD AUTO: 9.1 FL (ref 6–12)
POTASSIUM SERPL-SCNC: 4.4 MMOL/L (ref 3.5–5.2)
PROT SERPL-MCNC: 6.6 G/DL (ref 6–8.5)
PROT UR QL STRIP: NEGATIVE
QT INTERVAL: 374 MS
RBC # BLD AUTO: 4.85 10*6/MM3 (ref 4.14–5.8)
SODIUM SERPL-SCNC: 140 MMOL/L (ref 136–145)
SP GR UR STRIP: 1.02 (ref 1–1.03)
UROBILINOGEN UR QL STRIP: NORMAL
WBC NRBC COR # BLD: 6.61 10*3/MM3 (ref 3.4–10.8)

## 2023-02-22 PROCEDURE — 80053 COMPREHEN METABOLIC PANEL: CPT

## 2023-02-22 PROCEDURE — 71046 X-RAY EXAM CHEST 2 VIEWS: CPT

## 2023-02-22 PROCEDURE — 93005 ELECTROCARDIOGRAM TRACING: CPT

## 2023-02-22 PROCEDURE — 81003 URINALYSIS AUTO W/O SCOPE: CPT

## 2023-02-22 PROCEDURE — 85027 COMPLETE CBC AUTOMATED: CPT

## 2023-02-22 PROCEDURE — 36415 COLL VENOUS BLD VENIPUNCTURE: CPT

## 2023-02-22 PROCEDURE — 93010 ELECTROCARDIOGRAM REPORT: CPT | Performed by: STUDENT IN AN ORGANIZED HEALTH CARE EDUCATION/TRAINING PROGRAM

## 2023-02-22 RX ORDER — HYDROCODONE BITARTRATE AND ACETAMINOPHEN 5; 325 MG/1; MG/1
1 TABLET ORAL EVERY 6 HOURS PRN
COMMUNITY

## 2023-02-22 RX ORDER — FINASTERIDE 5 MG/1
5 TABLET, FILM COATED ORAL NIGHTLY
COMMUNITY

## 2023-02-22 RX ORDER — ZONISAMIDE 100 MG/1
2 CAPSULE ORAL NIGHTLY
COMMUNITY
Start: 2022-09-07

## 2023-02-22 RX ORDER — NIFEDIPINE 30 MG/1
30 TABLET, FILM COATED, EXTENDED RELEASE ORAL NIGHTLY
COMMUNITY

## 2023-02-22 RX ORDER — TRAZODONE HYDROCHLORIDE 100 MG/1
100 TABLET ORAL NIGHTLY PRN
COMMUNITY
Start: 2023-02-06

## 2023-02-22 NOTE — DISCHARGE INSTRUCTIONS
Arrive to hospital on your day of surgery at  ( WILL CALL DAY BEFORE WITH ARRIVAL TIME)      Take the following medications the morning of surgery:  NONE      If you are on prescription narcotic pain medication to control your pain you may also take that medication the morning of surgery.    General Instructions:  Do not eat solid food after midnight the night before surgery.  You may drink clear liquids day of surgery but must stop at least one hour before your hospital arrival time.  It is beneficial for you to have a clear drink that contains carbohydrates the day of surgery.  We suggest a 12 to 20 ounce bottle of Gatorade or Powerade for non-diabetic patients or a 12 to 20 ounce bottle of G2 or Powerade Zero for diabetic patients. (Pediatric patients, are not advised to drink a 12 to 20 ounce carbohydrate drink)    Clear liquids are liquids you can see through.  Nothing red in color.     Plain water                               Sports drinks  Sodas                                   Gelatin (Jell-O)  Fruit juices without pulp such as white grape juice and apple juice  Popsicles that contain no fruit or yogurt  Tea or coffee (no cream or milk added)  Gatorade / Powerade  G2 / Powerade Zero    Infants may have breast milk up to four hours before surgery.  Infants drinking formula may drink formula up to six hours before surgery.   Patients who avoid smoking, chewing tobacco and alcohol for 4 weeks prior to surgery have a reduced risk of post-operative complications.  Quit smoking as many days before surgery as you can.  Do not smoke, use chewing tobacco or drink alcohol the day of surgery.   If applicable bring your C-PAP/ BI-PAP machine.  Bring any papers given to you in the doctor’s office.  Wear clean comfortable clothes.  Do not wear contact lenses, false eyelashes or make-up.  Bring a case for your glasses.   Bring crutches or walker if applicable.  Remove all piercings.  Leave jewelry and any other valuables  at home.  Hair extensions with metal clips must be removed prior to surgery.  The Pre-Admission Testing nurse will instruct you to bring medications if unable to obtain an accurate list in Pre-Admission Testing.        If you were given a blood bank ID arm band remember to bring it with you the day of surgery.    Preventing a Surgical Site Infection:  For 2 to 3 days before surgery, avoid shaving with a razor because the razor can irritate skin and make it easier to develop an infection.    Any areas of open skin can increase the risk of a post-operative wound infection by allowing bacteria to enter and travel throughout the body.  Notify your surgeon if you have any skin wounds / rashes even if it is not near the expected surgical site.  The area will need assessed to determine if surgery should be delayed until it is healed.  The night prior to surgery shower using a fresh bar of anti-bacterial soap (such as Dial) and clean washcloth.  Sleep in a clean bed with clean clothing.  Do not allow pets to sleep with you.  Shower on the morning of surgery using a fresh bar of anti-bacterial soap (such as Dial) and clean washcloth.  Dry with a clean towel and dress in clean clothing.  Ask your surgeon if you will be receiving antibiotics prior to surgery.  Make sure you, your family, and all healthcare providers clean their hands with soap and water or an alcohol based hand  before caring for you or your wound.    Day of surgery:  Your arrival time is approximately two hours before your scheduled surgery time.  Upon arrival, a Pre-op nurse and Anesthesiologist will review your health history, obtain vital signs, and answer questions you may have.  The only belongings needed at this time will be a list of your home medications and if applicable your C-PAP/BI-PAP machine.  A Pre-op nurse will start an IV and you may receive medication in preparation for surgery, including something to help you relax.     Please be  aware that surgery does come with discomfort.  We want to make every effort to control your discomfort so please discuss any uncontrolled symptoms with your nurse.   Your doctor will most likely have prescribed pain medications.      If you are going home after surgery you will receive individualized written care instructions before being discharged.  A responsible adult must drive you to and from the hospital on the day of your surgery and stay with you for 24 hours.  Discharge prescriptions can be filled by the hospital pharmacy during regular pharmacy hours.  If you are having surgery late in the day/evening your prescription may be e-prescribed to your pharmacy.  Please verify your pharmacy hours or chose a 24 hour pharmacy to avoid not having access to your prescription because your pharmacy has closed for the day.    If you are staying overnight following surgery, you will be transported to your hospital room following the recovery period.  [facility] has all private rooms.    If you have any questions please call Pre-Admission Testing at [phone].  Deductibles and co-payments are collected on the day of service. Please be prepared to pay the required co-pay, deductible or deposit on the day of service as defined by your plan.    Call your surgeon immediately if you experience any of the following symptoms:  Sore Throat  Shortness of Breath or difficulty breathing  Cough  Chills  Body soreness or muscle pain  Headache  Fever  New loss of taste or smell  Do not arrive for your surgery ill.  Your procedure will need to be rescheduled to another time.  You will need to call your physician before the day of surgery to avoid any unnecessary exposure to hospital staff as well as other patients.

## 2023-02-28 ENCOUNTER — OFFICE VISIT (OUTPATIENT)
Dept: SLEEP MEDICINE | Facility: HOSPITAL | Age: 63
End: 2023-02-28
Payer: COMMERCIAL

## 2023-02-28 VITALS
SYSTOLIC BLOOD PRESSURE: 145 MMHG | HEART RATE: 96 BPM | WEIGHT: 315 LBS | OXYGEN SATURATION: 96 % | BODY MASS INDEX: 42.66 KG/M2 | HEIGHT: 72 IN | DIASTOLIC BLOOD PRESSURE: 87 MMHG

## 2023-02-28 DIAGNOSIS — I10 PRIMARY HYPERTENSION: ICD-10-CM

## 2023-02-28 DIAGNOSIS — G47.33 OBSTRUCTIVE SLEEP APNEA, ADULT: Primary | ICD-10-CM

## 2023-02-28 DIAGNOSIS — I27.81 COR PULMONALE, CHRONIC: ICD-10-CM

## 2023-02-28 DIAGNOSIS — E66.01 MORBID (SEVERE) OBESITY DUE TO EXCESS CALORIES: ICD-10-CM

## 2023-02-28 PROCEDURE — G0463 HOSPITAL OUTPT CLINIC VISIT: HCPCS

## 2023-02-28 NOTE — PROGRESS NOTES
SLEEP/PULMONARY  CLINIC NOTE      PATIENT IDENTIFICATION:  Name: Michael Lopez  Age: 62 y.o.  Sex: male  :  1960  MRN: [id number]    DATE OF CONSULTATION:  2023                     CHIEF COMPLAINT: Obstructive sleep apnea    History of Present Illness:   Michael Lopez is a 62 y.o. male Pt on CPAP feeling better more energy especially the night he use it more than 4 hours, no sleepiness no fatigue no tiredness, no mask irritation no dryness, compliance report reviewed with pt d discussed with the patient the download data and encouarged the patient to continue to use the CPAP. The residual AHI is acceptable, his machine broke recently and he is using somewhat less machine he is using it religiously with good usage, but his AHI is still high because is not the right pressure setting        Review of Systems:   Constitutional:  Above   Eyes: negative   ENT/oropharynx: negative   Cardiovascular: negative   Respiratory: negative   Gastrointestinal: negative   Genitourinary: negative   Neurological: negative   Musculoskeletal: negative   Integument/breast: negative   Endocrine: negative   Allergic/Immunologic: negative     Past Medical History:  Past Medical History:   Diagnosis Date   • Allergies    • Arthritis    • Concussion    • Hypertension    • Insomnia    • Left shoulder pain    • Migraine     DAILY   • S/P Botox injection     FOR MIGRAINES, NOT EFFECTIVE   • Sleep apnea     CPAP   • TBI (traumatic brain injury) 10/06/2020    HIT HEAD ON STAIRCASE AT WORK   • Torn rotator cuff     LEFT   • Tremors of nervous system     LEFT SIDED; LEFT ARM, SHOULDER AND LEG       Past Surgical History:  Past Surgical History:   Procedure Laterality Date   • ABDOMINAL HERNIA REPAIR N/A    • COLONOSCOPY N/A    • COLONOSCOPY N/A 2019    Procedure: COLONOSCOPY INTO CECUM;  Surgeon: Genaro Blackwell MD;  Location: [facility] ENDOSCOPY;  Service: General   • ENDOSCOPY N/A 2019    Procedure:  "ESOPHAGOGASTRODUODENOSCOPY;  Surgeon: Genaro Blackwell MD;  Location: [facility] ENDOSCOPY;  Service: General   • EYE SURGERY      as a child for \"cross sided\"   • HAND SURGERY Left    • TONSILLECTOMY     • VASECTOMY          Family History:  Family History   Problem Relation Age of Onset   • Diverticulitis Mother    • Arthritis Mother    • Heart disease Father    • Colon polyps Father    • No Known Problems Sister    • Cancer Maternal Grandmother         cancer hx in their 90's   • Cancer Paternal Grandfather         cancer hx in their 90's   • Malig Hyperthermia Neg Hx         Social History:   Social History     Tobacco Use   • Smoking status: Never   • Smokeless tobacco: Never   Substance Use Topics   • Alcohol use: Not Currently     Comment: OCCASIONAL        Allergies:  No Known Allergies    Home Meds:  (Not in a hospital admission)      Objective:    Vitals Ranges:   Heart Rate:  [96] 96  BP: (145)/(87) 145/87  Body mass index is 49.91 kg/m².     Exam:  General Appearance:  WDWN    HEENT:   without obvious abnormality,  Conjunctiva/corneas clear,  Normal external ear canals, no drainage    Clear orsalmucosa,  Mallampati score 3    Neck:  Supple, symmetrical, trachea midline. No JVD.  Lungs:   Bilateral basal rhonchi bilaterally, respirations unlabored symmetrical wall movement.    Chest wall:  No tenderness or deformity.    Heart:  Regular rate and rhythm, S1 and S2 normal.  Extremities: Trace edema no clubbing or Cyanosis        Data Review:  All labs (24hrs): No results found for this or any previous visit (from the past 24 hour(s)).     Imaging:  XR Chest PA & Lateral  TWO-VIEW CHEST     HISTORY: Preop for surgery. Hypertension.     FINDINGS: The lungs are well-expanded and clear and the heart and hilar  structures are within normal limits. There is no acute disease.     This report was finalized on 2/22/2023 10:54 AM by Dr. Regino Ngo M.D.          ASSESSMENT:  Diagnoses and all orders for this " visit:    Obstructive sleep apnea, adult    Morbid (severe) obesity due to excess calories (HCC)    Primary hypertension    Cor pulmonale, chronic (HCC)        PLAN:  Get replacement auto CPAP pressure 10 to 24 cm H2O auto    This patient with obstructive sleep apnea, compliance is improved. Encourage to use it more frequent, I re-emphasized on pt the long and short term benefit of treating NATALIYA. The device is benefiting the patient.  The patient is also instructed to get the CPAP supplies from the "[employer] and see me in 1 year for follow-up.    Treating NATALIYA will improve BP control and cor pulmonale symptoms     Follow-up 6 weeks    Corey Parish MD. D, ABSM.  2/28/2023  15:33 EST

## 2023-03-06 ENCOUNTER — TELEPHONE (OUTPATIENT)
Dept: SLEEP MEDICINE | Facility: HOSPITAL | Age: 63
End: 2023-03-06
Payer: COMMERCIAL

## 2023-03-07 ENCOUNTER — HOSPITAL ENCOUNTER (OUTPATIENT)
Facility: HOSPITAL | Age: 63
Setting detail: HOSPITAL OUTPATIENT SURGERY
Discharge: HOME | End: 2023-03-07
Attending: ORTHOPAEDIC SURGERY | Admitting: ORTHOPAEDIC SURGERY
Payer: COMMERCIAL

## 2023-03-07 ENCOUNTER — ANESTHESIA (OUTPATIENT)
Dept: PERIOP | Facility: HOSPITAL | Age: 63
End: 2023-03-07
Payer: COMMERCIAL

## 2023-03-07 ENCOUNTER — ANESTHESIA EVENT (OUTPATIENT)
Dept: PERIOP | Facility: HOSPITAL | Age: 63
End: 2023-03-07
Payer: COMMERCIAL

## 2023-03-07 VITALS
TEMPERATURE: 97 F | DIASTOLIC BLOOD PRESSURE: 84 MMHG | SYSTOLIC BLOOD PRESSURE: 142 MMHG | OXYGEN SATURATION: 93 % | RESPIRATION RATE: 18 BRPM | HEART RATE: 91 BPM

## 2023-03-07 DIAGNOSIS — M25.512 ACUTE PAIN OF LEFT SHOULDER: Primary | ICD-10-CM

## 2023-03-07 PROCEDURE — 25010000002 ROPIVACAINE PER 1 MG: Performed by: ANESTHESIOLOGY

## 2023-03-07 PROCEDURE — C1713 ANCHOR/SCREW BN/BN,TIS/BN: HCPCS | Performed by: ORTHOPAEDIC SURGERY

## 2023-03-07 PROCEDURE — 25010000002 FENTANYL CITRATE (PF) 50 MCG/ML SOLUTION: Performed by: ANESTHESIOLOGY

## 2023-03-07 PROCEDURE — 25010000002 DEXAMETHASONE SODIUM PHOSPHATE 20 MG/5ML SOLUTION: Performed by: ANESTHESIOLOGY

## 2023-03-07 PROCEDURE — 25010000002 PROPOFOL 10 MG/ML EMULSION: Performed by: NURSE ANESTHETIST, CERTIFIED REGISTERED

## 2023-03-07 PROCEDURE — 25010000002 EPINEPHRINE PER 0.1 MG: Performed by: ORTHOPAEDIC SURGERY

## 2023-03-07 PROCEDURE — 25010000002 ONDANSETRON PER 1 MG: Performed by: NURSE ANESTHETIST, CERTIFIED REGISTERED

## 2023-03-07 PROCEDURE — 25010000002 MIDAZOLAM PER 1 MG: Performed by: ANESTHESIOLOGY

## 2023-03-07 PROCEDURE — L3670 SO ACRO/CLAV CAN WEB PRE OTS: HCPCS | Performed by: ORTHOPAEDIC SURGERY

## 2023-03-07 PROCEDURE — 25010000002 CEFAZOLIN PER 500 MG: Performed by: ORTHOPAEDIC SURGERY

## 2023-03-07 DEVICE — IMPLANTABLE DEVICE
Type: IMPLANTABLE DEVICE | Site: SHOULDER | Status: FUNCTIONAL
Brand: BIOWICK® SURELOCK®

## 2023-03-07 DEVICE — IMPLANTABLE DEVICE
Type: IMPLANTABLE DEVICE | Site: SHOULDER | Status: FUNCTIONAL
Brand: QUATTRO® LINK SP KNOTLESS ANCHOR

## 2023-03-07 RX ORDER — PROMETHAZINE HYDROCHLORIDE 25 MG/1
25 SUPPOSITORY RECTAL ONCE AS NEEDED
Status: DISCONTINUED | OUTPATIENT
Start: 2023-03-07 | End: 2023-03-07 | Stop reason: HOSPADM

## 2023-03-07 RX ORDER — DROPERIDOL 2.5 MG/ML
0.62 INJECTION, SOLUTION INTRAMUSCULAR; INTRAVENOUS
Status: DISCONTINUED | OUTPATIENT
Start: 2023-03-07 | End: 2023-03-07 | Stop reason: HOSPADM

## 2023-03-07 RX ORDER — FENTANYL CITRATE 50 UG/ML
50 INJECTION, SOLUTION INTRAMUSCULAR; INTRAVENOUS
Status: DISCONTINUED | OUTPATIENT
Start: 2023-03-07 | End: 2023-03-07 | Stop reason: HOSPADM

## 2023-03-07 RX ORDER — ROPIVACAINE HYDROCHLORIDE 5 MG/ML
INJECTION, SOLUTION EPIDURAL; INFILTRATION; PERINEURAL
Status: COMPLETED | OUTPATIENT
Start: 2023-03-07 | End: 2023-03-07

## 2023-03-07 RX ORDER — HYDRALAZINE HYDROCHLORIDE 20 MG/ML
5 INJECTION INTRAMUSCULAR; INTRAVENOUS
Status: DISCONTINUED | OUTPATIENT
Start: 2023-03-07 | End: 2023-03-07 | Stop reason: HOSPADM

## 2023-03-07 RX ORDER — ONDANSETRON 4 MG/1
4 TABLET, FILM COATED ORAL EVERY 8 HOURS PRN
Qty: 30 TABLET | Refills: 0 | Status: SHIPPED | OUTPATIENT
Start: 2023-03-07

## 2023-03-07 RX ORDER — LIDOCAINE HYDROCHLORIDE 20 MG/ML
INJECTION, SOLUTION INTRAVENOUS AS NEEDED
Status: DISCONTINUED | OUTPATIENT
Start: 2023-03-07 | End: 2023-03-07 | Stop reason: SURG

## 2023-03-07 RX ORDER — HYDROCODONE BITARTRATE AND ACETAMINOPHEN 7.5; 325 MG/1; MG/1
1 TABLET ORAL ONCE AS NEEDED
Status: COMPLETED | OUTPATIENT
Start: 2023-03-07 | End: 2023-03-07

## 2023-03-07 RX ORDER — FLUMAZENIL 0.1 MG/ML
0.2 INJECTION INTRAVENOUS AS NEEDED
Status: DISCONTINUED | OUTPATIENT
Start: 2023-03-07 | End: 2023-03-07 | Stop reason: HOSPADM

## 2023-03-07 RX ORDER — LABETALOL HYDROCHLORIDE 5 MG/ML
5 INJECTION, SOLUTION INTRAVENOUS
Status: DISCONTINUED | OUTPATIENT
Start: 2023-03-07 | End: 2023-03-07 | Stop reason: HOSPADM

## 2023-03-07 RX ORDER — PROMETHAZINE HYDROCHLORIDE 25 MG/1
25 TABLET ORAL ONCE AS NEEDED
Status: DISCONTINUED | OUTPATIENT
Start: 2023-03-07 | End: 2023-03-07 | Stop reason: HOSPADM

## 2023-03-07 RX ORDER — ONDANSETRON 2 MG/ML
INJECTION INTRAMUSCULAR; INTRAVENOUS AS NEEDED
Status: DISCONTINUED | OUTPATIENT
Start: 2023-03-07 | End: 2023-03-07 | Stop reason: SURG

## 2023-03-07 RX ORDER — SODIUM CHLORIDE, SODIUM LACTATE, POTASSIUM CHLORIDE, AND CALCIUM CHLORIDE .6; .31; .03; .02 G/100ML; G/100ML; G/100ML; G/100ML
IRRIGANT IRRIGATION AS NEEDED
Status: DISCONTINUED | OUTPATIENT
Start: 2023-03-07 | End: 2023-03-07 | Stop reason: HOSPADM

## 2023-03-07 RX ORDER — NALOXONE HCL 0.4 MG/ML
0.2 VIAL (ML) INJECTION AS NEEDED
Status: DISCONTINUED | OUTPATIENT
Start: 2023-03-07 | End: 2023-03-07 | Stop reason: HOSPADM

## 2023-03-07 RX ORDER — PHENYLEPHRINE HCL IN 0.9% NACL 1 MG/10 ML
SYRINGE (ML) INTRAVENOUS AS NEEDED
Status: DISCONTINUED | OUTPATIENT
Start: 2023-03-07 | End: 2023-03-07 | Stop reason: SURG

## 2023-03-07 RX ORDER — MIDAZOLAM HYDROCHLORIDE 1 MG/ML
1 INJECTION INTRAMUSCULAR; INTRAVENOUS
Status: DISCONTINUED | OUTPATIENT
Start: 2023-03-07 | End: 2023-03-07 | Stop reason: HOSPADM

## 2023-03-07 RX ORDER — ONDANSETRON 2 MG/ML
4 INJECTION INTRAMUSCULAR; INTRAVENOUS ONCE AS NEEDED
Status: DISCONTINUED | OUTPATIENT
Start: 2023-03-07 | End: 2023-03-07 | Stop reason: HOSPADM

## 2023-03-07 RX ORDER — SODIUM CHLORIDE 0.9 % (FLUSH) 0.9 %
3 SYRINGE (ML) INJECTION EVERY 12 HOURS SCHEDULED
Status: DISCONTINUED | OUTPATIENT
Start: 2023-03-07 | End: 2023-03-07 | Stop reason: HOSPADM

## 2023-03-07 RX ORDER — DIPHENHYDRAMINE HYDROCHLORIDE 50 MG/ML
12.5 INJECTION INTRAMUSCULAR; INTRAVENOUS
Status: DISCONTINUED | OUTPATIENT
Start: 2023-03-07 | End: 2023-03-07 | Stop reason: HOSPADM

## 2023-03-07 RX ORDER — DEXAMETHASONE SODIUM PHOSPHATE 4 MG/ML
INJECTION, SOLUTION INTRA-ARTICULAR; INTRALESIONAL; INTRAMUSCULAR; INTRAVENOUS; SOFT TISSUE
Status: COMPLETED | OUTPATIENT
Start: 2023-03-07 | End: 2023-03-07

## 2023-03-07 RX ORDER — LIDOCAINE HYDROCHLORIDE 10 MG/ML
0.5 INJECTION, SOLUTION EPIDURAL; INFILTRATION; INTRACAUDAL; PERINEURAL ONCE AS NEEDED
Status: DISCONTINUED | OUTPATIENT
Start: 2023-03-07 | End: 2023-03-07 | Stop reason: HOSPADM

## 2023-03-07 RX ORDER — HYDROMORPHONE HYDROCHLORIDE 1 MG/ML
0.5 INJECTION, SOLUTION INTRAMUSCULAR; INTRAVENOUS; SUBCUTANEOUS
Status: DISCONTINUED | OUTPATIENT
Start: 2023-03-07 | End: 2023-03-07 | Stop reason: HOSPADM

## 2023-03-07 RX ORDER — OXYCODONE AND ACETAMINOPHEN 7.5; 325 MG/1; MG/1
1 TABLET ORAL EVERY 4 HOURS PRN
Status: DISCONTINUED | OUTPATIENT
Start: 2023-03-07 | End: 2023-03-07 | Stop reason: HOSPADM

## 2023-03-07 RX ORDER — CEPHALEXIN 500 MG/1
500 CAPSULE ORAL EVERY 6 HOURS
Qty: 20 CAPSULE | Refills: 0 | Status: SHIPPED | OUTPATIENT
Start: 2023-03-07 | End: 2023-03-12

## 2023-03-07 RX ORDER — SODIUM CHLORIDE, SODIUM LACTATE, POTASSIUM CHLORIDE, CALCIUM CHLORIDE 600; 310; 30; 20 MG/100ML; MG/100ML; MG/100ML; MG/100ML
9 INJECTION, SOLUTION INTRAVENOUS CONTINUOUS
Status: DISCONTINUED | OUTPATIENT
Start: 2023-03-07 | End: 2023-03-07 | Stop reason: HOSPADM

## 2023-03-07 RX ORDER — EPHEDRINE SULFATE 50 MG/ML
5 INJECTION, SOLUTION INTRAVENOUS ONCE AS NEEDED
Status: DISCONTINUED | OUTPATIENT
Start: 2023-03-07 | End: 2023-03-07 | Stop reason: HOSPADM

## 2023-03-07 RX ORDER — IPRATROPIUM BROMIDE AND ALBUTEROL SULFATE 2.5; .5 MG/3ML; MG/3ML
3 SOLUTION RESPIRATORY (INHALATION) ONCE AS NEEDED
Status: DISCONTINUED | OUTPATIENT
Start: 2023-03-07 | End: 2023-03-07 | Stop reason: HOSPADM

## 2023-03-07 RX ORDER — PROPOFOL 10 MG/ML
VIAL (ML) INTRAVENOUS AS NEEDED
Status: DISCONTINUED | OUTPATIENT
Start: 2023-03-07 | End: 2023-03-07 | Stop reason: SURG

## 2023-03-07 RX ORDER — EPHEDRINE SULFATE 50 MG/ML
INJECTION INTRAVENOUS AS NEEDED
Status: DISCONTINUED | OUTPATIENT
Start: 2023-03-07 | End: 2023-03-07 | Stop reason: SURG

## 2023-03-07 RX ORDER — CEFAZOLIN SODIUM IN 0.9 % NACL 3 G/100 ML
3 INTRAVENOUS SOLUTION, PIGGYBACK (ML) INTRAVENOUS ONCE
Status: COMPLETED | OUTPATIENT
Start: 2023-03-07 | End: 2023-03-07

## 2023-03-07 RX ORDER — FAMOTIDINE 10 MG/ML
20 INJECTION, SOLUTION INTRAVENOUS ONCE
Status: COMPLETED | OUTPATIENT
Start: 2023-03-07 | End: 2023-03-07

## 2023-03-07 RX ORDER — SUCCINYLCHOLINE/SOD CL,ISO/PF 200MG/10ML
SYRINGE (ML) INTRAVENOUS AS NEEDED
Status: DISCONTINUED | OUTPATIENT
Start: 2023-03-07 | End: 2023-03-07 | Stop reason: SURG

## 2023-03-07 RX ORDER — SODIUM CHLORIDE 0.9 % (FLUSH) 0.9 %
3-10 SYRINGE (ML) INJECTION AS NEEDED
Status: DISCONTINUED | OUTPATIENT
Start: 2023-03-07 | End: 2023-03-07 | Stop reason: HOSPADM

## 2023-03-07 RX ORDER — OXYCODONE AND ACETAMINOPHEN 7.5; 325 MG/1; MG/1
1 TABLET ORAL EVERY 6 HOURS PRN
Qty: 30 TABLET | Refills: 0 | Status: SHIPPED | OUTPATIENT
Start: 2023-03-07

## 2023-03-07 RX ADMIN — FAMOTIDINE 20 MG: 10 INJECTION INTRAVENOUS at 06:13

## 2023-03-07 RX ADMIN — SODIUM CHLORIDE, POTASSIUM CHLORIDE, SODIUM LACTATE AND CALCIUM CHLORIDE: 600; 310; 30; 20 INJECTION, SOLUTION INTRAVENOUS at 06:57

## 2023-03-07 RX ADMIN — EPHEDRINE SULFATE 10 MG: 50 INJECTION INTRAVENOUS at 07:40

## 2023-03-07 RX ADMIN — ONDANSETRON 4 MG: 2 INJECTION INTRAMUSCULAR; INTRAVENOUS at 08:20

## 2023-03-07 RX ADMIN — Medication 100 MCG: at 07:31

## 2023-03-07 RX ADMIN — FENTANYL CITRATE 50 MCG: 50 INJECTION, SOLUTION INTRAMUSCULAR; INTRAVENOUS at 06:33

## 2023-03-07 RX ADMIN — LIDOCAINE HYDROCHLORIDE 100 MG: 20 INJECTION, SOLUTION INTRAVENOUS at 07:01

## 2023-03-07 RX ADMIN — MIDAZOLAM 2 MG: 1 INJECTION INTRAMUSCULAR; INTRAVENOUS at 06:33

## 2023-03-07 RX ADMIN — Medication 200 MG: at 07:01

## 2023-03-07 RX ADMIN — DEXAMETHASONE SODIUM PHOSPHATE 4 MG: 4 INJECTION, SOLUTION INTRAMUSCULAR; INTRAVENOUS at 06:25

## 2023-03-07 RX ADMIN — PROPOFOL 200 MG: 10 INJECTION, EMULSION INTRAVENOUS at 07:01

## 2023-03-07 RX ADMIN — ROPIVACAINE HYDROCHLORIDE 20 ML: 5 INJECTION EPIDURAL; INFILTRATION; PERINEURAL at 06:25

## 2023-03-07 RX ADMIN — EPHEDRINE SULFATE 10 MG: 50 INJECTION INTRAVENOUS at 07:33

## 2023-03-07 RX ADMIN — CEFAZOLIN 3 G: 10 INJECTION, POWDER, FOR SOLUTION INTRAVENOUS at 06:50

## 2023-03-07 RX ADMIN — HYDROCODONE BITARTRATE AND ACETAMINOPHEN 1 TABLET: 7.5; 325 TABLET ORAL at 10:19

## 2023-03-07 RX ADMIN — Medication 100 MCG: at 07:25

## 2023-03-07 RX ADMIN — Medication 100 MCG: at 07:20

## 2023-03-07 RX ADMIN — PROPOFOL 50 MG: 10 INJECTION, EMULSION INTRAVENOUS at 08:08

## 2023-03-07 RX ADMIN — Medication 100 MCG: at 07:40

## 2023-03-07 RX ADMIN — DEXAMETHASONE SODIUM PHOSPHATE 8 MG: 4 INJECTION, SOLUTION INTRAMUSCULAR; INTRAVENOUS at 07:09

## 2023-03-07 NOTE — DISCHARGE INSTRUCTIONS
What to expect after a Nerve Block    Nerve blocks administered to block pain affect many types of nerves, including those nerves that control movement, pain, and normal sensation. Following a nerve block, you may notice some bruising at the site where the block was given. You may experience sensations such as: numbness of the affected area or limb, tingling, heaviness (that is the limb feels heavy to you), weakness or inability to move the affected arm or leg, or a feeling as if your arm or leg has “fallen asleep.”     A nerve block can last from 2 to 36 hours depending on the medications used.  Usually the weakness wears off first followed by the tingling and heaviness. As the block wears off, you may begin to notice pain; however, this sequence of events may occur in any order. Typically, you will be able to move your limb before you will feel it. Once a nerve block begins to wear off, the effects are usually completely gone within 60 minutes.  If you experience continued side effects that you believe are block related for longer than 48 hours, please call your healthcare provider. Please see block-specific instructions below.    Instructions for any block involving the shoulder or arm  If you have had any kind of shoulder/arm block, you will go home with your arm in a sling. Wear the sling until the block has completely worn off. You may be required to wear it for a longer period of time per your surgeon’s recommendations.  If you have had a shoulder/arm block, it is a good idea to sleep on a recliner with pillows under your arm.    You may experience symptoms such as:  Shortness of breath  Hoarseness   Blurry vision  Unequal pupils  Drooping of your face on the same side as the block was performed    These are side effects associated with this kind of block and should go away within 12 hours.    Note: If you have severe or prolonged shortness of breath, please seek medical assistance as soon as possible.      Protection of a “blocked” arm or leg (limb)  After a nerve block, you cannot feel pain, pressure, or extremes of temperature in the affected limb. And because of this, your blocked limb is at more risk for injury. For example, it is possible to burn your limb on an extremely hot surface without feeling it.     When resting, it is important to reposition your limb periodically to avoid prolonged pressure on it. This may require the use of pillows and padding.    While sleeping, you should avoid rolling onto the affected limb or putting too much pressure on it.     If you have a cast or tight dressing, check the color of your fingers or toes of the affected limb. Call your surgeon if they look discolored (that is, dusky, dark colored).    Use caution in cold weather. Cover your limb appropriately to protect it from the cold.      Pain Management:    Your surgeon will give you a prescription for pain medication. Begin taking this before the nerve block wears off. Bear in mind that sometimes the block can wear off in the middle of the night.    ____________________________________________________________________________________________________________________    Pendulum Exercises for Post Op Shoulder Surgery    Lean over with your good arm supported on a table or chair.  Relax the injured arm and allow it to hang straight down at your side.  Slowly begin to swing the relaxed arm back and forth.  Then swing it side to side.  Next, move it in a Solomon. Now,  reverse the direction.      To do this exercise while sitting:  Sit in a chair or at the edge of your bed with your feet flat on the floor.  Let your affected arm hang down in front of you over the edge of the bed or chair.  Relax your shoulder, arm, and hand.  Rock your body so your arm gently swings in small circles. You can also use your unaffected arm to start the motion.  Repeat changing the direction of the circles, swinging your arm left and right, and  swinging your arm forward and back.    To do this exercise while standing:  Stand next to a sturdy chair or table, and hold on to it with your hand on your unaffected side.  Bend forward at the waist.  Bend your knees slightly.  Relax your shoulder, arm, and hand.  While keeping your shoulder relaxed, use body motion to swing your arm in small circles.  Repeat changing the direction of the circles, swinging your arm left and right, and swinging your arm forward and back.  Between exercises, stand up tall and take a short break to relax your lower back.

## 2023-03-07 NOTE — ANESTHESIA PROCEDURE NOTES
Peripheral Block      Patient reassessed immediately prior to procedure    Patient location during procedure: pre-op  Start time: 3/7/2023 6:25 AM  Stop time: 3/7/2023 6:30 AM  Reason for block: procedure for pain, at surgeon's request and post-op pain management  Performed by  Anesthesiologist: Rito Browning MD  Preanesthetic Checklist  Completed: patient identified, IV checked, site marked, risks and benefits discussed, surgical consent, monitors and equipment checked, pre-op evaluation and timeout performed  Prep:  Pt Position: supine  Sterile barriers:cap, gloves, sterile barriers, washed/disinfected hands and mask  Prep: ChloraPrep  Patient monitoring: blood pressure monitoring, continuous pulse oximetry and EKG  Procedure    Sedation: yes  Performed under: local infiltration  Guidance:ultrasound guided  Images:still images obtained, printed/placed on chart    Laterality:left  Block Type:interscalene  Injection Technique:single-shot  Needle Type:echogenic  Resistance on Injection: none    Medications Used: dexamethasone (DECADRON) injection - Injection   4 mg - 3/7/2023 6:25:00 AM  ropivacaine (NAROPIN) 0.5 % injection - Injection   20 mL - 3/7/2023 6:25:00 AM      Post Assessment  Injection Assessment: negative aspiration for heme, no paresthesia on injection and incremental injection  Patient Tolerance:comfortable throughout block  Complications:no

## 2023-03-07 NOTE — ANESTHESIA PREPROCEDURE EVALUATION
Anesthesia Evaluation     NPO Solid Status: > 8 hours  NPO Liquid Status: > 8 hours           Airway   Mallampati: II  Large neck circumference and Possible difficult intubation  Dental      Pulmonary    (+) sleep apnea,   Cardiovascular   Exercise tolerance: good (4-7 METS)    (+) hypertension,       Neuro/Psych  (+) headaches, tremors,      ROS Comment: TBI , slight delay   GI/Hepatic/Renal/Endo    (+) morbid obesity,      Musculoskeletal     (+) neck pain,   Abdominal    Substance History      OB/GYN          Other   arthritis,                    Anesthesia Plan    ASA 3     general     (Regional for [facility] PSR  )  intravenous induction     Anesthetic plan, risks, benefits, and alternatives have been provided, discussed and informed consent has been obtained with: patient.    Plan discussed with CRNA.        CODE STATUS:

## 2023-03-07 NOTE — BRIEF OP NOTE
SHOULDER ARTHROSCOPY WITH ROTATOR CUFF REPAIR  Progress Note    Michael Lopez  3/7/2023    Pre-op Diagnosis:   Lt rct, lt, bt, wendy, obesity       Post-Op Diagnosis Codes:   same    Procedure/CPT® Codes:        Procedure(s):  LEFT SHOULDER ARTHROSCOPY WITH ROTATOR CUFF REPAIR, SUBACROMIAL DECOMPRESSION W/ ACROMIOPLASTY AND BICEPS TENOTOMY        Surgeon(s):  Kaz Jackman MD    Anesthesia: General    Staff:   Circulator: Karol Lindsey RN  Scrub Person: Kerry Gonzales         Estimated Blood Loss: minimal    Urine Voided: * No values recorded between 3/7/2023  6:52 AM and 3/7/2023  7:57 AM *    Specimens:                None          Drains: * No LDAs found *    Findings: above        Complications: none known       was responsible for performing the following activities: Retraction and their skilled assistance was necessary for the success of this case.    BRITNI Jackman MD     Date: 3/7/2023  Time: 07:57 EST

## 2023-03-07 NOTE — ANESTHESIA PROCEDURE NOTES
Airway  Urgency: elective    Date/Time: 3/7/2023 7:04 AM  Airway not difficult    General Information and Staff    Patient location during procedure: OR  Anesthesiologist: Rito Browning MD  CRNA/CAA: Lyudmila Angeles CRNA    Indications and Patient Condition  Indications for airway management: airway protection    Preoxygenated: yes  MILS not maintained throughout  Mask difficulty assessment: 1 - vent by mask    Final Airway Details  Final airway type: endotracheal airway      Successful airway: ETT  Cuffed: yes   Successful intubation technique: direct laryngoscopy  Facilitating devices/methods: anterior pressure/BURP  Endotracheal tube insertion site: oral  Blade: Kishore  Blade size: 3  ETT size (mm): 7.5  Cormack-Lehane Classification: grade IIa - partial view of glottis  Placement verified by: chest auscultation and capnometry   Cuff volume (mL): 8  Measured from: lips  ETT/EBT  to lips (cm): 23  Number of attempts at approach: 1  Assessment: lips, teeth, and gum same as pre-op and atraumatic intubation    Additional Comments  Pt preoxygenated prior to induction, easy mask airway, atraumatic intubation,+ ETCO2, + bs bilat,  ETT secured and connected to ventilator.

## 2023-03-07 NOTE — ANESTHESIA POSTPROCEDURE EVALUATION
Patient: Michael Lopez    Procedure Summary     Date: 03/07/23 Room / Location:  [facility]  [facility]    Anesthesia Start: 0656 Anesthesia Stop: 0832    Procedure: LEFT SHOULDER ARTHROSCOPY WITH ROTATOR CUFF REPAIR, SUBACROMIAL DECOMPRESSION W/ ACROMIOPLASTY AND BICEPS TENOTOMY (Left: Shoulder) Diagnosis:     Surgeons: Kaz Jackman MD Provider: Rito Browning MD    Anesthesia Type: general ASA Status: 3          Anesthesia Type: general    Vitals  Vitals Value Taken Time   /84 03/07/23 1045   Temp 36.1 °C (97 °F) 03/07/23 0835   Pulse 93 03/07/23 1048   Resp 16 03/07/23 0900   SpO2 91 % 03/07/23 1048   Vitals shown include unvalidated device data.        Post Anesthesia Care and Evaluation    Patient location during evaluation: bedside  Patient participation: complete - patient participated  Level of consciousness: awake and alert  Pain management: adequate    Airway patency: patent  Anesthetic complications: No anesthetic complications    Cardiovascular status: acceptable  Respiratory status: acceptable  Hydration status: acceptable    Comments: /79   Pulse 93   Temp 36.1 °C (97 °F) (Temporal)   Resp 16   SpO2 94%

## 2023-05-23 ENCOUNTER — OFFICE VISIT (OUTPATIENT)
Dept: SLEEP MEDICINE | Facility: HOSPITAL | Age: 63
End: 2023-05-23
Payer: COMMERCIAL

## 2023-05-23 VITALS
HEIGHT: 72 IN | OXYGEN SATURATION: 95 % | BODY MASS INDEX: 42.66 KG/M2 | HEART RATE: 90 BPM | DIASTOLIC BLOOD PRESSURE: 79 MMHG | SYSTOLIC BLOOD PRESSURE: 126 MMHG | WEIGHT: 315 LBS

## 2023-05-23 DIAGNOSIS — E66.01 MORBID (SEVERE) OBESITY DUE TO EXCESS CALORIES: ICD-10-CM

## 2023-05-23 DIAGNOSIS — G47.33 OBSTRUCTIVE SLEEP APNEA, ADULT: Primary | ICD-10-CM

## 2023-05-23 DIAGNOSIS — I10 PRIMARY HYPERTENSION: ICD-10-CM

## 2023-05-23 DIAGNOSIS — F51.02 ADJUSTMENT INSOMNIA: ICD-10-CM

## 2023-05-23 PROCEDURE — G0463 HOSPITAL OUTPT CLINIC VISIT: HCPCS

## 2023-05-23 NOTE — PROGRESS NOTES
"SLEEP/PULMONARY  CLINIC NOTE      PATIENT IDENTIFICATION:  Name: Michael Lopez  Age: 62 y.o.  Sex: male  :  1960  MRN: [id number]    DATE OF CONSULTATION:  2023                     CHIEF COMPLAINT: NATALIYA    History of Present Illness:   Michael Lopez is a 62 y.o. male Pt on CPAP feeling better more energy especially the night he use it more than 4 hours, no sleepiness no fatigue no tiredness, no mask irritation no dryness, compliance report reviewed with pt d discussed with the patient the download data and encouarged the patient to continue to use the CPAP. The residual AHI is acceptable.      Review of Systems:   Constitutional: As above    Eyes: negative   ENT/oropharynx: negative   Cardiovascular: negative   Respiratory: As above    Gastrointestinal: negative   Genitourinary: negative   Neurological: negative   Musculoskeletal: negative   Integument/breast: negative   Endocrine: negative   Allergic/Immunologic: negative     Past Medical History:  Past Medical History:   Diagnosis Date   • Allergies    • Arthritis    • Concussion    • Hypertension    • Insomnia    • Left shoulder pain    • Migraine     DAILY   • S/P Botox injection     FOR MIGRAINES, NOT EFFECTIVE   • Sleep apnea     CPAP   • TBI (traumatic brain injury) 10/06/2020    HIT HEAD ON STAIRCASE AT WORK   • Torn rotator cuff     LEFT   • Tremors of nervous system     LEFT SIDED; LEFT ARM, SHOULDER AND LEG       Past Surgical History:  Past Surgical History:   Procedure Laterality Date   • ABDOMINAL HERNIA REPAIR N/A    • COLONOSCOPY N/A    • COLONOSCOPY N/A 2019    Procedure: COLONOSCOPY INTO CECUM;  Surgeon: Genaro Blackwell MD;  Location: [facility] ENDOSCOPY;  Service: General   • ENDOSCOPY N/A 2019    Procedure: ESOPHAGOGASTRODUODENOSCOPY;  Surgeon: Genaro Blackwell MD;  Location: [facility] ENDOSCOPY;  Service: General   • EYE SURGERY      as a child for \"cross sided\"   • HAND SURGERY Left    • SHOULDER ARTHROSCOPY W/ " ROTATOR CUFF REPAIR Left 3/7/2023    Procedure: LEFT SHOULDER ARTHROSCOPY WITH ROTATOR CUFF REPAIR, SUBACROMIAL DECOMPRESSION W/ ACROMIOPLASTY AND BICEPS TENOTOMY;  Surgeon: Kaz Jackman MD;  [place] [facility] [place] [facility] [place]  Service: Orthopedics;  Laterality: Left;   • TONSILLECTOMY     • VASECTOMY          Family History:  Family History   Problem Relation Age of Onset   • Diverticulitis Mother    • Arthritis Mother    • Heart disease Father    • Colon polyps Father    • No Known Problems Sister    • Cancer Maternal Grandmother         cancer hx in their 90's   • Cancer Paternal Grandfather         cancer hx in their 90's   • Malig Hyperthermia Neg Hx         Social History:   Social History     Tobacco Use   • Smoking status: Never   • Smokeless tobacco: Never   Substance Use Topics   • Alcohol use: Not Currently     Comment: OCCASIONAL        Allergies:  No Known Allergies    Home Meds:  (Not in a hospital admission)      Objective:    Vitals Ranges:   Heart Rate:  [90] 90  BP: (126)/(79) 126/79  Body mass index is 49.5 kg/m².     Exam:  General Appearance:  WDWN    HEENT:   without obvious abnormality,  Conjunctiva/corneas clear,  Normal external ear canals, no drainage    Clear orsalmucosa,  Mallampati score 3    Neck:  Supple, symmetrical, trachea midline. No JVD.  Lungs:   Bilateral basal rhonchi bilaterally, respirations unlabored symmetrical wall movement.    Chest wall:  No tenderness or deformity.    Heart:  Regular rate and rhythm, S1 and S2 normal.  Extremities: Trace edema no clubbing or Cyanosis        Data Review:  All labs (24hrs): No results found for this or any previous visit (from the past 24 hour(s)).     Imaging:  Peripheral Block  Rito Browning MD     3/7/2023  6:33 AM  Peripheral Block    Patient reassessed immediately prior to procedure    Patient location during procedure: pre-op  Start time: 3/7/2023 6:25 AM  Stop time: 3/7/2023 6:30 AM  Reason for block: procedure for pain, at  surgeon's request and post-op   pain management  Performed by  Anesthesiologist: Rito Browning MD  Preanesthetic Checklist  Completed: patient identified, IV checked, site marked, risks and benefits   discussed, surgical consent, monitors and equipment checked, pre-op   evaluation and timeout performed  Prep:  Pt Position: supine  Sterile barriers:cap, gloves, sterile barriers, washed/disinfected hands   and mask  Prep: ChloraPrep  Patient monitoring: blood pressure monitoring, continuous pulse oximetry   and EKG  Procedure    Sedation: yes  Performed under: local infiltration  Guidance:ultrasound guided  Images:still images obtained, printed/placed on chart    Laterality:left  Block Type:interscalene  Injection Technique:single-shot  Needle Type:echogenic  Resistance on Injection: none    Medications Used: dexamethasone (DECADRON) injection - Injection   4 mg - 3/7/2023 6:25:00 AM  ropivacaine (NAROPIN) 0.5 % injection - Injection   20 mL - 3/7/2023 6:25:00 AM    Post Assessment  Injection Assessment: negative aspiration for heme, no paresthesia on   injection and incremental injection  Patient Tolerance:comfortable throughout block  Complications:no       ASSESSMENT:  Diagnoses and all orders for this visit:    Obstructive sleep apnea, adult    Adjustment insomnia    Morbid (severe) obesity due to excess calories    Primary hypertension        PLAN:    This patient with obstructive sleep apnea, compliance is improved. Encourage to use it more frequent, I re-emphasized on pt the long and short term benefit of treating NATALIYA. The device is benefiting the patient.  The patient is also instructed to get the CPAP supplies from the [employer] and see me in 1 year for follow-up.    •Set a bedtime that is early enough for you to get at least 7 hours of sleep.  •Don’t go to bed unless you are sleepy.  •If you don’t fall asleep after 20 minutes, get out of bed.  •Establish a relaxing bedtime routine.  •Use your bed only  for sleep and sex.  •Make your bedroom quiet and relaxing. Keep the room at a comfortable, cool temperature.  •Limit exposure to bright light in the evenings.  •Turn off electronic devices at least 30 minutes before bedtime.  •Don’t eat a large meal before bedtime. If you are hungry at night, eat a light, healthy snack.  •Exercise regularly and maintain a healthy diet.  •Avoid consuming caffeine in the late afternoon or evening.  •Avoid consuming alcohol before bedtime.  •Reduce your fluid intake before bedtime.  •Avoid naps during the day time.     Treating NATALIYA will improve BP control     Follow-up 6 months    Corey Parish MD. D, ABSM.  5/23/2023  11:32 EDT

## 2023-06-14 ENCOUNTER — TELEPHONE (OUTPATIENT)
Dept: SLEEP MEDICINE | Facility: HOSPITAL | Age: 63
End: 2023-06-14
Payer: COMMERCIAL

## 2023-06-14 RX ORDER — QUETIAPINE FUMARATE 25 MG/1
25 TABLET, FILM COATED ORAL NIGHTLY
Qty: 30 TABLET | Refills: 11 | Status: SHIPPED | OUTPATIENT
Start: 2023-06-14

## 2023-11-28 ENCOUNTER — OFFICE VISIT (OUTPATIENT)
Dept: SLEEP MEDICINE | Facility: HOSPITAL | Age: 63
End: 2023-11-28
Payer: COMMERCIAL

## 2023-11-28 VITALS
HEART RATE: 90 BPM | SYSTOLIC BLOOD PRESSURE: 129 MMHG | OXYGEN SATURATION: 97 % | BODY MASS INDEX: 42.66 KG/M2 | WEIGHT: 315 LBS | DIASTOLIC BLOOD PRESSURE: 85 MMHG | HEIGHT: 72 IN

## 2023-11-28 DIAGNOSIS — I10 PRIMARY HYPERTENSION: ICD-10-CM

## 2023-11-28 DIAGNOSIS — F51.02 ADJUSTMENT INSOMNIA: ICD-10-CM

## 2023-11-28 DIAGNOSIS — G47.33 OBSTRUCTIVE SLEEP APNEA, ADULT: Primary | ICD-10-CM

## 2023-11-28 DIAGNOSIS — E66.01 MORBID (SEVERE) OBESITY DUE TO EXCESS CALORIES: ICD-10-CM

## 2023-11-28 PROCEDURE — G0463 HOSPITAL OUTPT CLINIC VISIT: HCPCS

## 2023-11-28 RX ORDER — TRAZODONE HYDROCHLORIDE 100 MG/1
100 TABLET ORAL NIGHTLY
Qty: 30 TABLET | Refills: 5 | Status: SHIPPED | OUTPATIENT
Start: 2023-11-28

## 2023-11-28 NOTE — PROGRESS NOTES
SLEEP/PULMONARY  CLINIC NOTE      PATIENT IDENTIFICATION:  Name: Michael Lopez  Age: 63 y.o.  Sex: male  :  1960  MRN: [id number]    DATE OF CONSULTATION:  2023                     CHIEF COMPLAINT: NATALIYA    History of Present Illness:   Michael Lopez is a 63 y.o. male Pt on CPAP feeling better more energy especially the night he use it more than 4 hours, no sleepiness no fatigue no tiredness, no mask irritation no dryness, compliance report reviewed with pt d discussed with the patient the download data and encouarged the patient to continue to use the CPAP. The residual AHI is acceptable it is 10.     Patient still having significant insomnia  2 to 3 hours when he goes to bed at night multiple waking up at night and he reporting to me he is total sleep and day between 5 to 6 hours, even his compliance report showing he is using it more, he is taking his a Seroquel like An hour before bedtime and he is not taking trazodone anymore  Patient with RLS still taking the Requip but he is taking 3 mg at bedtime    Review of Systems:   Constitutional: As above   Eyes: negative   ENT/oropharynx: negative   Cardiovascular: negative   Respiratory: negative   Gastrointestinal: negative   Genitourinary: negative   Neurological: negative   Musculoskeletal: negative   Integument/breast: negative   Endocrine: negative   Allergic/Immunologic: negative     Past Medical History:  Past Medical History:   Diagnosis Date    Allergies     Arthritis     Concussion     Hypertension     Insomnia     Left shoulder pain     Migraine     DAILY    S/P Botox injection     FOR MIGRAINES, NOT EFFECTIVE    Sleep apnea     CPAP    TBI (traumatic brain injury) 10/06/2020    HIT HEAD ON STAIRCASE AT WORK    Torn rotator cuff     LEFT    Tremors of nervous system     LEFT SIDED; LEFT ARM, SHOULDER AND LEG       Past Surgical History:  Past Surgical History:   Procedure Laterality Date    ABDOMINAL HERNIA REPAIR N/A     COLONOSCOPY N/A  "2006    COLONOSCOPY N/A 08/08/2019    Procedure: COLONOSCOPY INTO CECUM;  Surgeon: Genaro Blackwell MD;  Location:  [facility] ENDOSCOPY;  Service: General    ENDOSCOPY N/A 08/08/2019    Procedure: ESOPHAGOGASTRODUODENOSCOPY;  Surgeon: Genaro Blackwell MD;  [facility];  Service: General    EYE SURGERY      as a child for \"cross sided\"    HAND SURGERY Left     SHOULDER ARTHROSCOPY W/ ROTATOR CUFF REPAIR Left 3/7/2023    Procedure: LEFT SHOULDER ARTHROSCOPY WITH ROTATOR CUFF REPAIR, SUBACROMIAL DECOMPRESSION W/ ACROMIOPLASTY AND BICEPS TENOTOMY;  Surgeon: Kaz Jackman MD;  [place]  [facility] [place] [facility] [place]  Service: Orthopedics;  Laterality: Left;    TONSILLECTOMY      VASECTOMY          Family History:  Family History   Problem Relation Age of Onset    Diverticulitis Mother     Arthritis Mother     Heart disease Father     Colon polyps Father     No Known Problems Sister     Cancer Maternal Grandmother         cancer hx in their 90's    Cancer Paternal Grandfather         cancer hx in their 90's    Malig Hyperthermia Neg Hx         Social History:   Social History     Tobacco Use    Smoking status: Never    Smokeless tobacco: Never   Substance Use Topics    Alcohol use: Not Currently     Comment: OCCASIONAL        Allergies:  No Known Allergies    Home Meds:  (Not in a hospital admission)      Objective:    Vitals Ranges:   Heart Rate:  [90] 90  BP: (129)/(85) 129/85  Body mass index is 50.86 kg/m².     Exam:  General Appearance:  WDWN    HEENT:   without obvious abnormality,  Conjunctiva/corneas clear,  Normal external ear canals, no drainage    Clear orsalmucosa,  Mallampati score 3    Neck:  Supple, symmetrical, trachea midline. No JVD.  Lungs:   Bilateral basal rhonchi bilaterally, respirations unlabored symmetrical wall movement.    Chest wall:  No tenderness or deformity.    Heart:  Regular rate and rhythm, S1 and S2 normal.  Extremities: Trace edema no clubbing or Cyanosis        Data Review:  All " labs (24hrs): No results found for this or any previous visit (from the past 24 hour(s)).     Imaging:  Peripheral Block  Rito Browning MD     3/7/2023  6:33 AM  Peripheral Block    Patient reassessed immediately prior to procedure    Patient location during procedure: pre-op  Start time: 3/7/2023 6:25 AM  Stop time: 3/7/2023 6:30 AM  Reason for block: procedure for pain, at surgeon's request and post-op   pain management  Performed by  Anesthesiologist: Rito Browning MD  Preanesthetic Checklist  Completed: patient identified, IV checked, site marked, risks and benefits   discussed, surgical consent, monitors and equipment checked, pre-op   evaluation and timeout performed  Prep:  Pt Position: supine  Sterile barriers:cap, gloves, sterile barriers, washed/disinfected hands   and mask  Prep: ChloraPrep  Patient monitoring: blood pressure monitoring, continuous pulse oximetry   and EKG  Procedure    Sedation: yes  Performed under: local infiltration  Guidance:ultrasound guided  Images:still images obtained, printed/placed on chart    Laterality:left  Block Type:interscalene  Injection Technique:single-shot  Needle Type:echogenic  Resistance on Injection: none    Medications Used: dexamethasone (DECADRON) injection - Injection   4 mg - 3/7/2023 6:25:00 AM  ropivacaine (NAROPIN) 0.5 % injection - Injection   20 mL - 3/7/2023 6:25:00 AM    Post Assessment  Injection Assessment: negative aspiration for heme, no paresthesia on   injection and incremental injection  Patient Tolerance:comfortable throughout block  Complications:no       ASSESSMENT:  Diagnoses and all orders for this visit:    Obstructive sleep apnea, adult    Adjustment insomnia    Morbid (severe) obesity due to excess calories    Primary hypertension    Other orders  -     traZODone (DESYREL) 100 MG tablet; Take 1 tablet by mouth Every Night.        PLAN:  Set a bedtime that is early enough for you to get at least 7 hours of sleep.  Don’t go to bed  unless you are sleepy.  If you don’t fall asleep after 20 minutes, get out of bed.  Establish a relaxing bedtime routine.  Use your bed only for sleep and sex.  Make your bedroom quiet and relaxing. Keep the room at a comfortable, cool temperature.  Limit exposure to bright light in the evenings.  Turn off electronic devices at least 30 minutes before bedtime.  Don’t eat a large meal before bedtime. If you are hungry at night, eat a light, healthy snack.  Exercise regularly and maintain a healthy diet.  Avoid consuming caffeine in the late afternoon or evening.  Avoid consuming alcohol before bedtime.  Reduce your fluid intake before bedtime.  Avoid naps during the day time.  Will add trazodone 100 mg to take it 1 hour to 1-1/2 before bedtime    This patient with obstructive sleep apnea, compliance is improved. Encourage to use it more frequent, I re-emphasized on pt the long and short term benefit of treating NATALIYA. The device is benefiting the patient.  The patient is also instructed to get the CPAP supplies from the [employer] company and see me in 1 year for follow-up.    Treating NATALIYA will improve BP control       Follow-up 3 months    Corey Parish MD. D, ABSM.  11/28/2023  14:08 EST

## 2024-03-12 ENCOUNTER — OFFICE VISIT (OUTPATIENT)
Dept: SLEEP MEDICINE | Facility: HOSPITAL | Age: 64
End: 2024-03-12
Payer: COMMERCIAL

## 2024-03-12 VITALS
SYSTOLIC BLOOD PRESSURE: 145 MMHG | OXYGEN SATURATION: 95 % | HEIGHT: 72 IN | HEART RATE: 112 BPM | BODY MASS INDEX: 42.66 KG/M2 | WEIGHT: 315 LBS | DIASTOLIC BLOOD PRESSURE: 78 MMHG

## 2024-03-12 DIAGNOSIS — F51.02 ADJUSTMENT INSOMNIA: ICD-10-CM

## 2024-03-12 DIAGNOSIS — I10 PRIMARY HYPERTENSION: ICD-10-CM

## 2024-03-12 DIAGNOSIS — G47.33 OBSTRUCTIVE SLEEP APNEA, ADULT: Primary | ICD-10-CM

## 2024-03-12 DIAGNOSIS — I27.81 COR PULMONALE, CHRONIC: ICD-10-CM

## 2024-03-12 DIAGNOSIS — E66.01 MORBID (SEVERE) OBESITY DUE TO EXCESS CALORIES: ICD-10-CM

## 2024-03-12 PROCEDURE — G0463 HOSPITAL OUTPT CLINIC VISIT: HCPCS

## 2024-03-12 RX ORDER — TRAZODONE HYDROCHLORIDE 100 MG/1
150 TABLET ORAL NIGHTLY
Qty: 90 TABLET | Refills: 3 | Status: SHIPPED | OUTPATIENT
Start: 2024-03-12

## 2024-03-12 NOTE — PROGRESS NOTES
SLEEP/PULMONARY  CLINIC NOTE      PATIENT IDENTIFICATION:  Name: Michael Lopez  Age: 63 y.o.  Sex: male  :  1960  MRN: [id number]    DATE OF CONSULTATION:  3/12/2024                     CHIEF COMPLAINT: NATALIYA    History of Present Illness:   Michael Lopez is a 63 y.o. male Pt on CPAP feeling better more energy especially the night he use it more than 4 hours, no sleepiness no fatigue no tiredness, no mask irritation no dryness, compliance report reviewed with pt d discussed with the patient the download data and encouarged the patient to continue to use the CPAP. The residual AHI is acceptable.     Patient still having significant insomnia symptoms taking up to 2 hours to fall asleep, is better when he is taking his trazodone but he ran out of it, and he is following the sleep hygiene tips  Patient has history of episode of panic attack and anxiety but he does not follow-up with a psychiatrist      Review of Systems:   Constitutional: As above   Eyes: negative   ENT/oropharynx: negative   Cardiovascular: negative   Respiratory: As above   Gastrointestinal: negative   Genitourinary: negative   Neurological: negative   Musculoskeletal: negative   Integument/breast: negative   Endocrine: negative   Allergic/Immunologic: negative     Past Medical History:  Past Medical History:   Diagnosis Date    Allergies     Arthritis     Concussion     Hypertension     Insomnia     Left shoulder pain     Migraine     DAILY    S/P Botox injection     FOR MIGRAINES, NOT EFFECTIVE    Sleep apnea     CPAP    TBI (traumatic brain injury) 10/06/2020    HIT HEAD ON STAIRCASE AT WORK    Torn rotator cuff     LEFT    Tremors of nervous system     LEFT SIDED; LEFT ARM, SHOULDER AND LEG       Past Surgical History:  Past Surgical History:   Procedure Laterality Date    ABDOMINAL HERNIA REPAIR N/A     COLONOSCOPY N/A     COLONOSCOPY N/A 2019    Procedure: COLONOSCOPY INTO CECUM;  Surgeon: Genaro Blackwell MD;  Location:   "[facility] ENDOSCOPY;  Service: General    ENDOSCOPY N/A 08/08/2019    Procedure: ESOPHAGOGASTRODUODENOSCOPY;  Surgeon: Genaro Blackwell MD;  Location:  [facility] ENDOSCOPY;  Service: General    EYE SURGERY      as a child for \"cross sided\"    HAND SURGERY Left     SHOULDER ARTHROSCOPY W/ ROTATOR CUFF REPAIR Left 3/7/2023    Procedure: LEFT SHOULDER ARTHROSCOPY WITH ROTATOR CUFF REPAIR, SUBACROMIAL DECOMPRESSION W/ ACROMIOPLASTY AND BICEPS TENOTOMY;  Surgeon: Kaz Jackman MD;  [place]  [facility] [place] [facility] [place]  Service: [place]opedics;  Laterality: Left;    TONSILLECTOMY      VASECTOMY          Family History:  Family History   Problem Relation Age of Onset    Diverticulitis Mother     Arthritis Mother     Heart disease Father     Colon polyps Father     No Known Problems Sister     Cancer Maternal Grandmother         cancer hx in their 90's    Cancer Paternal Grandfather         cancer hx in their 90's    Malig Hyperthermia Neg Hx         Social History:   Social History     Tobacco Use    Smoking status: Never    Smokeless tobacco: Never   Substance Use Topics    Alcohol use: Not Currently     Comment: OCCASIONAL        Allergies:  No Known Allergies    Home Meds:  (Not in a hospital admission)      Objective:    Vitals Ranges:   Heart Rate:  [112] 112  BP: (145)/(78) 145/78  Body mass index is 52.22 kg/m².     Exam:  General Appearance:  WDWN    HEENT:   without obvious abnormality,  Conjunctiva/corneas clear,  Normal external ear canals, no drainage    Clear orsalmucosa,  Mallampati score 3    Neck:  Supple, symmetrical, trachea midline. No JVD.  Lungs:   Bilateral basal rhonchi bilaterally, respirations unlabored symmetrical wall movement.    Chest wall:  No tenderness or deformity.    Heart:  Regular rate and rhythm, S1 and S2 normal.  Extremities: Trace edema no clubbing or Cyanosis        Data Review:  All labs (24hrs): No results found for this or any previous visit (from the past 24 hour(s)).     Imaging:  Peripheral " Block  Rito Browning MD     3/7/2023  6:33 AM  Peripheral Block    Patient reassessed immediately prior to procedure    Patient location during procedure: pre-op  Start time: 3/7/2023 6:25 AM  Stop time: 3/7/2023 6:30 AM  Reason for block: procedure for pain, at surgeon's request and post-op   pain management  Performed by  Anesthesiologist: Rito Browning MD  Preanesthetic Checklist  Completed: patient identified, IV checked, site marked, risks and benefits   discussed, surgical consent, monitors and equipment checked, pre-op   evaluation and timeout performed  Prep:  Pt Position: supine  Sterile barriers:cap, gloves, sterile barriers, washed/disinfected hands   and mask  Prep: ChloraPrep  Patient monitoring: blood pressure monitoring, continuous pulse oximetry   and EKG  Procedure    Sedation: yes  Performed under: local infiltration  Guidance:ultrasound guided  Images:still images obtained, printed/placed on chart    Laterality:left  Block Type:interscalene  Injection Technique:single-shot  Needle Type:echogenic  Resistance on Injection: none    Medications Used: dexamethasone (DECADRON) injection - Injection   4 mg - 3/7/2023 6:25:00 AM  ropivacaine (NAROPIN) 0.5 % injection - Injection   20 mL - 3/7/2023 6:25:00 AM    Post Assessment  Injection Assessment: negative aspiration for heme, no paresthesia on   injection and incremental injection  Patient Tolerance:comfortable throughout block  Complications:no       ASSESSMENT:  Diagnoses and all orders for this visit:    Obstructive sleep apnea, adult    Adjustment insomnia    Morbid (severe) obesity due to excess calories    Primary hypertension    Cor pulmonale, chronic    Other orders  -     traZODone (DESYREL) 100 MG tablet; Take 1.5 tablets by mouth Every Night.        PLAN:  Will restart trazodone at 150 mg nightly  This patient with obstructive sleep apnea, compliance is improved. Encourage to use it more frequent, I re-emphasized on pt the long and  short term benefit of treating NATALIYA. The device is benefiting the patient.  The patient is also instructed to get the CPAP supplies from the [employer] company and see me in 1 year for follow-up.    Treating NATALIYA will improve BP control and cor pulmonale symptoms       Follow-up 6 months    Corey Parish MD. D, ABSM.  3/12/2024  11:44 EDT

## 2024-09-17 ENCOUNTER — OFFICE VISIT (OUTPATIENT)
Dept: SLEEP MEDICINE | Facility: HOSPITAL | Age: 64
End: 2024-09-17
Payer: COMMERCIAL

## 2024-09-17 VITALS
HEIGHT: 72 IN | OXYGEN SATURATION: 96 % | WEIGHT: 315 LBS | DIASTOLIC BLOOD PRESSURE: 83 MMHG | BODY MASS INDEX: 42.66 KG/M2 | HEART RATE: 93 BPM | SYSTOLIC BLOOD PRESSURE: 118 MMHG

## 2024-09-17 DIAGNOSIS — I27.81 COR PULMONALE, CHRONIC: ICD-10-CM

## 2024-09-17 DIAGNOSIS — E66.01 MORBID (SEVERE) OBESITY DUE TO EXCESS CALORIES: ICD-10-CM

## 2024-09-17 DIAGNOSIS — F51.02 ADJUSTMENT INSOMNIA: ICD-10-CM

## 2024-09-17 DIAGNOSIS — I10 PRIMARY HYPERTENSION: ICD-10-CM

## 2024-09-17 DIAGNOSIS — G47.33 OBSTRUCTIVE SLEEP APNEA, ADULT: Primary | ICD-10-CM

## 2024-09-17 PROCEDURE — G0463 HOSPITAL OUTPT CLINIC VISIT: HCPCS

## 2024-09-17 RX ORDER — FUROSEMIDE 20 MG
20 TABLET ORAL 2 TIMES DAILY
Qty: 60 TABLET | Refills: 11 | Status: SHIPPED | OUTPATIENT
Start: 2024-09-17

## 2025-03-25 ENCOUNTER — OFFICE VISIT (OUTPATIENT)
Dept: SLEEP MEDICINE | Facility: HOSPITAL | Age: 65
End: 2025-03-25
Payer: COMMERCIAL

## 2025-03-25 VITALS
HEART RATE: 99 BPM | BODY MASS INDEX: 42.66 KG/M2 | OXYGEN SATURATION: 96 % | HEIGHT: 72 IN | SYSTOLIC BLOOD PRESSURE: 149 MMHG | DIASTOLIC BLOOD PRESSURE: 78 MMHG | WEIGHT: 315 LBS

## 2025-03-25 DIAGNOSIS — G47.33 OBSTRUCTIVE SLEEP APNEA, ADULT: Primary | ICD-10-CM

## 2025-03-25 DIAGNOSIS — F51.02 ADJUSTMENT INSOMNIA: ICD-10-CM

## 2025-03-25 DIAGNOSIS — I27.81 COR PULMONALE, CHRONIC: ICD-10-CM

## 2025-03-25 DIAGNOSIS — E66.01 MORBID (SEVERE) OBESITY DUE TO EXCESS CALORIES: ICD-10-CM

## 2025-03-25 PROCEDURE — G0463 HOSPITAL OUTPT CLINIC VISIT: HCPCS

## 2025-03-25 NOTE — PROGRESS NOTES
SLEEP/PULMONARY  CLINIC NOTE      PATIENT IDENTIFICATION:  Name: Michael Lopez  Age: 64 y.o.  Sex: male  :  1960  MRN: [id number]    DATE OF CONSULTATION:  3/25/2025     Referring physician:    Kortney Bhagat MD            CHIEF COMPLAINT: NATALIYA    History of Present Illness:   Michael Lopez is a 64 y.o. male Pt on CPAP feeling better more energy especially the night he use it more than 4 hours, no sleepiness no fatigue no tiredness, no mask irritation no dryness, compliance report reviewed with pt d discussed with the patient the download data and encouarged the patient to continue to use the CPAP. The residual AHI is acceptable.     Still having insomnia controlled with trazodone maintaining 6 to 7 hours sleep during the day and 24 hours    Review of Systems:   Constitutional: ne as above gative   Eyes: negative   ENT/oropharynx: negative   Cardiovascular: negative   Respiratory: negative   Gastrointestinal: negative   Genitourinary: negative   Neurological: negative   Musculoskeletal: negative   Integument/breast: negative   Endocrine: negative   Allergic/Immunologic: negative     Past Medical History:  Past Medical History:   Diagnosis Date    Allergies     Arthritis     Concussion     Hypertension     Insomnia     Left shoulder pain     Migraine     DAILY    S/P Botox injection     FOR MIGRAINES, NOT EFFECTIVE    Sleep apnea     CPAP    TBI (traumatic brain injury) 10/06/2020    HIT HEAD ON STAIRCASE AT WORK    Torn rotator cuff     LEFT    Tremors of nervous system     LEFT SIDED; LEFT ARM, SHOULDER AND LEG       Past Surgical History:  Past Surgical History:   Procedure Laterality Date    ABDOMINAL HERNIA REPAIR N/A     COLONOSCOPY N/A     COLONOSCOPY N/A 2019    Procedure: COLONOSCOPY INTO CECUM;  Surgeon: Genaro Blackwell MD;  Location: [facility] ENDOSCOPY;  Service: General    ENDOSCOPY N/A 2019    Procedure: ESOPHAGOGASTRODUODENOSCOPY;  Surgeon: Genaor Blackwell MD;   "Location: [facility] ENDOSCOPY;  Service: General    EYE SURGERY      as a child for \"cross sided\"    HAND SURGERY Left     SHOULDER ARTHROSCOPY W/ ROTATOR CUFF REPAIR Left 3/7/2023    Procedure: LEFT SHOULDER ARTHROSCOPY WITH ROTATOR CUFF REPAIR, SUBACROMIAL DECOMPRESSION W/ ACROMIOPLASTY AND BICEPS TENOTOMY;  Surgeon: aKz Jackman MD;  Location: [facility] OR [facility];  Service: Orthopedics;  Laterality: Left;    TONSILLECTOMY      VASECTOMY          Family History:  Family History   Problem Relation Age of Onset    Diverticulitis Mother     Arthritis Mother     Heart disease Father     Colon polyps Father     No Known Problems Sister     Cancer Maternal Grandmother         cancer hx in their 90's    Cancer Paternal Grandfather         cancer hx in their 90's    Malig Hyperthermia Neg Hx         Social History:   Social History     Tobacco Use    Smoking status: Never    Smokeless tobacco: Never   Substance Use Topics    Alcohol use: Not Currently     Comment: OCCASIONAL        Allergies:  No Known Allergies    Home Meds:  (Not in a hospital admission)      Objective:    Vitals Ranges:   Heart Rate:  [99] 99  BP: (149)/(78) 149/78  Body mass index is 54.24 kg/m².     Exam:  General Appearance:  WDWN morbidly obese  HEENT:   without obvious abnormality,  Conjunctiva/corneas clear,  Normal external ear canals, no drainage    Clear orsalmucosa,  Mallampati score 3    Neck:  Supple, symmetrical, trachea midline. No JVD.  Lungs:   Bilateral basal rhonchi bilaterally, respirations unlabored symmetrical wall movement.    Chest wall:  No tenderness or deformity.    Heart:  Regular rate and rhythm, S1 and S2 normal.  Extremities: Trace edema no clubbing or Cyanosis        Data Review:  All labs (24hrs): No results found for this or any previous visit (from the past 24 hours).     Imaging:  Peripheral Block  Rito Browning MD     3/7/2023  6:33 AM  Peripheral Block    Patient reassessed immediately prior to procedure    Patient " location during procedure: pre-op  Start time: 3/7/2023 6:25 AM  Stop time: 3/7/2023 6:30 AM  Reason for block: procedure for pain, at surgeon's request and post-op   pain management  Performed by  Anesthesiologist: Rito Browning MD  Preanesthetic Checklist  Completed: patient identified, IV checked, site marked, risks and benefits   discussed, surgical consent, monitors and equipment checked, pre-op   evaluation and timeout performed  Prep:  Pt Position: supine  Sterile barriers:cap, gloves, sterile barriers, washed/disinfected hands   and mask  Prep: ChloraPrep  Patient monitoring: blood pressure monitoring, continuous pulse oximetry   and EKG  Procedure    Sedation: yes  Performed under: local infiltration  Guidance:ultrasound guided  Images:still images obtained, printed/placed on chart    Laterality:left  Block Type:interscalene  Injection Technique:single-shot  Needle Type:echogenic  Resistance on Injection: none    Medications Used: dexamethasone (DECADRON) injection - Injection   4 mg - 3/7/2023 6:25:00 AM  ropivacaine (NAROPIN) 0.5 % injection - Injection   20 mL - 3/7/2023 6:25:00 AM    Post Assessment  Injection Assessment: negative aspiration for heme, no paresthesia on   injection and incremental injection  Patient Tolerance:comfortable throughout block  Complications:no       ASSESSMENT:  Diagnoses and all orders for this visit:    Obstructive sleep apnea, adult    Adjustment insomnia    Morbid (severe) obesity due to excess calories    Cor pulmonale, chronic        PLAN:  Set a bedtime that is early enough for you to get at least 7 hours of sleep.  Don’t go to bed unless you are sleepy.  If you don’t fall asleep after 20 minutes, get out of bed.  Establish a relaxing bedtime routine.  Use your bed only for sleep and sex.  Make your bedroom quiet and relaxing. Keep the room at a comfortable, cool temperature.  Limit exposure to bright light in the evenings.  Turn off electronic devices at least 30  minutes before bedtime.  Don’t eat a large meal before bedtime. If you are hungry at night, eat a light, healthy snack.  Exercise regularly and maintain a healthy diet.  Avoid consuming caffeine in the late afternoon or evening.  Avoid consuming alcohol before bedtime.  Reduce your fluid intake before bedtime.  Avoid naps during the day time.     This patient with obstructive sleep apnea, compliance is improved. Encourage to use it more frequent, I re-emphasized on pt the long and short term benefit of treating NATALIYA. The device is benefiting the patient.  The patient is also instructed to get the CPAP supplies from the [employer] and see me in 1 year for follow-up.    Treating NATALIYA will improve cor pulmonale symptoms control       Follow-up 6-month    Corey Parish MD. D, ABSM.  3/25/2025  10:59 EDT

## 2025-05-28 ENCOUNTER — TELEPHONE (OUTPATIENT)
Dept: GASTROENTEROLOGY | Facility: CLINIC | Age: 65
End: 2025-05-28
Payer: COMMERCIAL

## (undated) DEVICE — TUBING, SUCTION, 1/4" X 10', STRAIGHT: Brand: MEDLINE

## (undated) DEVICE — BITEBLOCK OMNI BLOC

## (undated) DEVICE — SPNG LAP 18X18IN LF STRL PK/5

## (undated) DEVICE — Device: Brand: DEFENDO AIR/WATER/SUCTION AND BIOPSY VALVE

## (undated) DEVICE — BLD SHAVER RESEC SABRE COOLCUT 5MM 13CM

## (undated) DEVICE — THE TORRENT IRRIGATION SCOPE CONNECTOR IS USED WITH THE TORRENT IRRIGATION TUBING TO PROVIDE IRRIGATION FLUIDS SUCH AS STERILE WATER DURING GASTROINTESTINAL ENDOSCOPIC PROCEDURES WHEN USED IN CONJUNCTION WITH AN IRRIGATION PUMP (OR ELECTROSURGICAL UNIT).: Brand: TORRENT

## (undated) DEVICE — DRSNG WND GZ CURAD OIL EMULSION 3X3IN STRL

## (undated) DEVICE — SINGLE-USE BIOPSY FORCEPS: Brand: RADIAL JAW 4

## (undated) DEVICE — Device: Brand: QUATTRO® SUTURE PASSER NEEDLE

## (undated) DEVICE — SUT ETHLN 3/0 PS1 18IN 1663H

## (undated) DEVICE — IMMOB SHLDR PAD2 UNIV LG

## (undated) DEVICE — GLV SURG BIOGEL LTX PF 6 1/2

## (undated) DEVICE — BUR SHAVER FLUSHCUT 8FLUT 5MM 13CM

## (undated) DEVICE — GLV SURG SIGNATURE ESSENTIAL PF LTX SZ6.5

## (undated) DEVICE — GLV SURG BIOGEL LTX PF 8

## (undated) DEVICE — POSTN ARMSLV LAT/TRACTION DISP

## (undated) DEVICE — PK ARTHSCP SHLDR TOWER 40

## (undated) DEVICE — STRIP,CLOSURE,WOUND,MEDI-STRIP,1/2X4: Brand: MEDLINE

## (undated) DEVICE — Device: Brand: DRILL, 2.7MM, FOR BIOWICK™ SURELOCK™

## (undated) DEVICE — GLV SURG SIGNATURE ESSENTIAL PF LTX SZ7.5

## (undated) DEVICE — CANN TRPL DAM 7X7MM NO VLV

## (undated) DEVICE — BLANKT WARM LOWR/BDY 100X120CM

## (undated) DEVICE — ABL ASP APOLLO RF XL 90D

## (undated) DEVICE — SENSR O2 OXIMAX FNGR A/ 18IN NONSTR

## (undated) DEVICE — DRAPE,U/ SHT,SPLIT,PLAS,STERIL: Brand: MEDLINE

## (undated) DEVICE — TUBING SET, GRAVITY, 4-SPIKE

## (undated) DEVICE — SKIN PREP TRAY W/CHG: Brand: MEDLINE INDUSTRIES, INC.

## (undated) DEVICE — DRAPE,SHOULDER,BEACH ULTRAGARD: Brand: MEDLINE

## (undated) DEVICE — CANN NASL CO2 TRULINK W/O2 A/